# Patient Record
Sex: FEMALE | Race: WHITE | Employment: STUDENT | URBAN - METROPOLITAN AREA
[De-identification: names, ages, dates, MRNs, and addresses within clinical notes are randomized per-mention and may not be internally consistent; named-entity substitution may affect disease eponyms.]

---
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
## 2020-01-02 ENCOUNTER — HOSPITAL ENCOUNTER (OUTPATIENT)
Dept: PHYSICAL THERAPY | Age: 20
Setting detail: THERAPIES SERIES
Discharge: HOME | End: 2020-01-02
Payer: COMMERCIAL

## 2020-01-02 PROCEDURE — 97110 THERAPEUTIC EXERCISES: CPT

## 2020-01-02 PROCEDURE — 97530 THERAPEUTIC ACTIVITIES: CPT

## 2020-01-02 PROCEDURE — 97112 NEUROMUSCULAR REEDUCATION: CPT

## 2020-01-02 NOTE — FLOWSHEET NOTE
with band at knees   Chair Sits  dribblex5 to 60deg x30 dribblex5 to 60deg x30, will go to blue next rx   Forward step up 6in+airex dribblex5 x10 reps bilateral 6in+airex q3hoikuft x20 reps bilateral   Lateral Step Down Next rx 4in step, required band at lateral Rkneee to avoid valgus 2x15   Squat Hold on Bosu 8o46pdz with bball tossess at 60 deg +ball squeeze 1y10kza with band at knees to 60deg +bball throws   Calf Raises  DL off step x30 SL off step 2x10   4way hip Abd Add  40#x15, increase wgt next rx 55#x15   Lateral walking Next rx x20 each way    Proprioception x30 tossups SL black [id number] black, go to blue next rx   Excursions  bkwd on airex x15 bilateral with dribble SL black bosu bkwd x20   Shuttle Press 1C SL to 60deg x30 1Cx30   Extension Hang 2#5min 4#x5min   Quad Set over blue bolster x30 x30   SLR 2#x30 2#x30   Side Hip Abd 2#x30 2#x30   LAQ 2#x30 2#x30   Calf Stretch  2g80ffu 5o30tly   Ham Stretch seated 9j43hdg 3d54llr     Home Exercises Given: cont HEP the above exercises. Assessment:    Doing well, mild ITB pain, cont rolling    Treatment/Activity Tolerance:  [x] Patient tolerated treatment well [] Patient limited by fatigue  [] Patient limited by pain  [] Patient limited by other medical complications  [] Other:     Plan: Advance per protocol.   PT twice a week    Time In/Time Out: 4762-2121       Timed Code/Total Treatment Minutes:  2TE 1N 1TA    Electronically signed by:

## 2020-01-09 ENCOUNTER — HOSPITAL ENCOUNTER (OUTPATIENT)
Dept: PHYSICAL THERAPY | Age: 20
Setting detail: THERAPIES SERIES
Discharge: HOME | End: 2020-01-09
Payer: COMMERCIAL

## 2020-01-09 PROCEDURE — 97112 NEUROMUSCULAR REEDUCATION: CPT

## 2020-01-09 PROCEDURE — 97110 THERAPEUTIC EXERCISES: CPT

## 2020-01-09 NOTE — FLOWSHEET NOTE
[facility]) Physical Therapy at [address] [place], [address]  Phone: [phone]   Fax:[phone]         Physical Therapy Treatment Note  Date:  2020    Patient Name:  Alvin Vera      :  2000    MRN: [id number]    Diagnosis: L ACL Hamstring graft + Med Men Repair     Referring Practitioner: Dr Fatimah Harry  Next Doctor Visit:  Not scheduled      Insurance/Certification information:     Plan of care signed (Y/N): Pending  Changes to ambulatory summary sheet? No    Summary of history from Evaluation:  Sport:  Sophomore Basketball guard  Patient Goal: Pt wants to be released by Aug   Patient History/Mechanism of Injury:  during game, came down from jumping and felt a pop. Came out of game. Had MRI 2 days later. Surgery 19  PHM: R ACL Ham graft 2017     Subjective/Pain: States she went out sat night and increased standing    Eval 19   Medial ant knee sore 2-5/10 Medial hamstring pain 0/10   Post knee/med hamstring feels like a bruise + pulled hamstring tight and sore 3-6/10. Past 24hours = no pain meds or anti-inflam Posterior knee pain gone   Apple sized area of numbness lateral knee just inf to joint line 8/10 Apple sized area of numbness lateral knee just inf to joint line 2/10     Objective: mild increase swelling today, states she has been walking more  Extension: 3-4 deg flexed at onset of PT.  0 deg after hang/ES.   R knee=1-2flexed, never regained full flexion after last ACL sx 3 years ago  Flexion: 416053 degrees open endfeel     Exercises:  L ACL Ham graft + Med Men repair 19    Visit#10 Visit#11 Visit#12    -7wks 1/7=8wks 1/7=8wks   ROM          Extension  0deg after hang Tight today, 0deg after hang   Flexion (<90 for 4wks) 125 no pain open endfeel  130, mild tightness today 130   Exercises      Elliptical  2miles 14 min 2miles 14min 5min, add dynamics next rx   Prone Ball Hamstrings  Light hold 60sec Light hold 60sec Light hold 60sec   Bridges  Next rx DL hands up x30 with band at knees Band at knees on bosux30   Chair Sits  dribblex5 to 60deg x30 dribblex5 to 60deg x30, will go to blue next rx SL +6in x15   Forward step up 6in+airex dribblex5 x10 reps bilateral 6in+airex j7rpvqcso x20 reps bilateral 8in +airex p2pxcezmb x20   Lateral Step Down Next rx 4in step, required band at lateral Rkneee to avoid valgus 2x15   6in x30   Squat Hold on Bosu 7c77iop with bball tossess at 60 deg +ball squeeze 1q80rcp with band at knees to 60deg +bball throws 7p15xhk   Calf Raises  DL off step x30 SL off step 2x10 SL 2x10   4way hip Abd Add  40#x15, increase wgt next rx 55#x15 55#x15   Lateral walking Next rx x20 each way  x30 ea way   Proprioception x30 tossups SL black [id number] black, go to blue next rx Blue toussups   R=30 L=30   Excursions  bkwd on airex x15 bilateral with dribble SL black bosu bkwd x20 Post medial + post lateral x10   SL press   10# DL up and SL down 2x10   Quad Extension   Next rx   Extension Hang 2#5min 4#x5min 5#x5   Quad Set over blue bolster x30 x30 x30   SLR 2#x30 2#x30 3#x30   LAQ 2#x30 2#x30 3#x30   Calf Stretch  0j38koj 0p33lzn 0y05htt   Ham Stretch seated 3e19ubw 4h84zfj 7c57agi     Home Exercises Given: cont HEP the above exercises. Assessment:    Mild swelling, no pain, went to gym for shooting after PT today    Treatment/Activity Tolerance:  [x] Patient tolerated treatment well [] Patient limited by fatigue  [] Patient limited by pain  [] Patient limited by other medical complications  [] Other:     Plan: Advance per protocol.   PT twice a week    Time In/Time Out: 2489-9787       Timed Code/Total Treatment Minutes:  2TE 1N     Electronically signed by:

## 2020-01-14 ENCOUNTER — HOSPITAL ENCOUNTER (OUTPATIENT)
Dept: PHYSICAL THERAPY | Age: 20
Setting detail: THERAPIES SERIES
Discharge: HOME | End: 2020-01-14
Payer: COMMERCIAL

## 2020-01-14 PROCEDURE — 97112 NEUROMUSCULAR REEDUCATION: CPT

## 2020-01-14 PROCEDURE — 97110 THERAPEUTIC EXERCISES: CPT

## 2020-01-14 NOTE — FLOWSHEET NOTE
[facility]) Physical Therapy at [address] [place], [address]  Phone: [phone]   Fax:[phone]         Physical Therapy Treatment Note  Date:  2020    Patient Name:  Bel Kirkland      :  2000    MRN: [id number]    Diagnosis: L ACL Hamstring graft + Med Men Repair     Referring Practitioner: Dr Yolis Billy  Next Doctor Visit:  Not scheduled      Insurance/Certification information:     Plan of care signed (Y/N): Pending  Changes to ambulatory summary sheet? No    Summary of history from Evaluation:  Sport:  Sophomore Basketball guard  Patient Goal: Pt wants to be released by Aug   Patient History/Mechanism of Injury:  during game, came down from jumping and felt a pop. Came out of game. Had MRI 2 days later. Surgery 19  PHM: R ACL Ham graft 2017     Subjective/Pain: States she went out sat night and increased standing    Eval 19   Medial ant knee sore 2-5/10 Medial hamstring pain 0/10   Post knee/med hamstring feels like a bruise + pulled hamstring tight and sore 3-6/10. Past 24hours = no pain meds or anti-inflam Posterior knee pain gone   Apple sized area of numbness lateral knee just inf to joint line 8/10 Apple sized area of numbness lateral knee just inf to joint line 2/10     Objective: mild increase swelling today, states she has been walking more  Extension: 3-4 deg flexed at onset of PT.  0 deg after hang/ES.   R knee=1-2flexed, never regained full flexion after last ACL sx 3 years ago  Flexion: 269104 degrees open endfeel     Exercises:  L ACL Ham graft + Med Men repair 19    Visit#10 Visit#11 Visit#12 Visit#13    -7wks =8wks =8wks -9wks    ROM           Extension  0deg after hang Tight today, 0deg after hang o after hang   Flexion (<90 for 4wks) 125 no pain open endfeel  130, mild tightness today 130 130   Exercises       Elliptical  2miles 14 min

## 2020-01-17 ENCOUNTER — HOSPITAL ENCOUNTER (OUTPATIENT)
Dept: PHYSICAL THERAPY | Age: 20
Setting detail: THERAPIES SERIES
Discharge: HOME | End: 2020-01-17
Payer: COMMERCIAL

## 2020-01-17 PROCEDURE — 97110 THERAPEUTIC EXERCISES: CPT

## 2020-01-17 PROCEDURE — 97112 NEUROMUSCULAR REEDUCATION: CPT

## 2020-01-17 NOTE — FLOWSHEET NOTE
Bridges on bench  Band at knees on bosux30 DL on bench holding basketball  DL + throw x30   Chair Sits  SL +6in x15 SL+6in 30sec L=28 R=26 with good tech DL +tb on airex +3in + abd kick x20 andrea   Forward step up 8in +airex z1zkytywv x20 10in + airex x15 NT   Lateral Step Down   6in x30 6in R=27 with good tech L=21 + hip flex x20 on 6in bilateral   Squat Hold on Bosu 5k79yzt 4f57xsm with VB toss 5e43kshw + VB   Calf Raises  SL 2x10 SL 2x15 SL 2x15   4way hip Abd Add  55#x15 55#x15 55#x15   Drop hold split lunge  Back R foot on 6in step x20 drop   Lateral walking x30 ea way x20 ea way with MB sh extension x20 +MB punch out   Proprioception Blue toussups   R=30 L=30 NT NT   RDL  DL 9#bar to 12in step x15 DL 9#bar to 12in x15   Excursions  Post medial + post lateral x10 bkwd on blue bosu x20 bkwd = knee tuck x15 bilateral   SL press 10# DL up and SL down 2x10 10# to 70deg x10 NT   Quad Extension Next rx Next rx Next rx   Extension Hang 5#x5 5#x5min 5#x5min   Quad Set over blue bolster x30 NT NT   SLR 3#x30 NT 5#x30  SLabd5#x30   Calf Stretch  9y42mgb  7j04kof   Ham Stretch seated 9i07vsg  5y43vfn     Home Exercises Given: cont HEP the above exercises. Assessment:    No pain. Pt doing well. Treatment/Activity Tolerance:  [x] Patient tolerated treatment well [] Patient limited by fatigue  [] Patient limited by pain  [] Patient limited by other medical complications  [] Other:     Plan: Advance per protocol.   PT twice a week    Time In/Time Out: 5246-4249     Timed Code/Total Treatment Minutes:  2TE 1N     Electronically signed by:

## 2020-01-21 ENCOUNTER — HOSPITAL ENCOUNTER (OUTPATIENT)
Dept: PHYSICAL THERAPY | Age: 20
Setting detail: THERAPIES SERIES
Discharge: HOME | End: 2020-01-21
Payer: COMMERCIAL

## 2020-01-21 PROCEDURE — 97110 THERAPEUTIC EXERCISES: CPT

## 2020-01-21 PROCEDURE — 97112 NEUROMUSCULAR REEDUCATION: CPT

## 2020-01-21 NOTE — FLOWSHEET NOTE
[facility]) Physical Therapy at [address], [address]  Phone: [phone]   Fax:[phone]         Physical Therapy Treatment Note  Date:  2020    Patient Name:  Tyrone Duarte      :  2000    MRN: [id number]    Diagnosis: L ACL Hamstring graft + Med Men Repair     Referring Practitioner: Dr Girish Nichole  Next Doctor Visit:  Will see fellow up here at [facility] on wk 12     Insurance/Certification information:     Plan of care signed (Y/N): Pending  Changes to ambulatory summary sheet? No    Summary of history from Evaluation:  Sport:  Sophomore Basketball guard  Patient Goal: Pt wants to be released by Aug   Patient History/Mechanism of Injury:  during game, came down from jumping and felt a pop. Came out of game. Had MRI 2 days later. Surgery 19  PHM: R ACL Ham graft 2017     Subjective/Pain: States she went out sat night and increased standing    Eval 19   Medial ant knee sore 2-5/10 Medial hamstring pain 0/10   Post knee/med hamstring feels like a bruise + pulled hamstring tight and sore 3-6/10. Past 24hours = no pain meds or anti-inflam Posterior knee pain gone   Apple sized area of numbness lateral knee just inf to joint line 8/10 Apple sized area of numbness lateral knee just inf to joint line 2/10     Objective: no pain, no buckling, no catching. Extension: 3-4 deg flexed at onset of PT.  0 deg after hang/ES. R knee=1-2flexed, never regained full flexion after last ACL sx 3 years ago  Flexion: 135 degrees open endfeel     Exercises:  L ACL Ham graft + Med Men repair 19    Visit#14 Visit#15    =10wks =10wks   Warm Up      Elliptical .5mile 6:15 .5mile 6:20   Dynamics 100ft 100ft   Exercises     Agility no hop . 5mile 6:15 +dynamics x60sec   Air hops on  0Cx30   Split squat   Lfwd only x30   Prone Ball Hamstrings  Standing x60sec   Bridges on bench  DL + throw x30 DL + throw x30   Chair Sits  DL +tb on airex +3in + abd kick x20 andrea DL+3in on airex +abd kick x20 andrea   Forward step up NT 10in + airex +dribblex3 x20   Lateral Step Down + hip flex x20 on 6in bilateral +hip flex 6in x30 go to 8in next rx   Squat Hold on Bosu 3z56trqb + VB 8k40bgx +VB   Calf Raises  SL 2x15 SL 1x30   4way hip Abd Add  55#x15 70#x15   Lateral walking x20 +MB punch out x20 +MB to floor x20 each way   Proprioception NT Blue tossups x30 bilateral   RDL DL 9#bar to 12in x15 SL 10# plate M16 ea   Excursions  bkwd = knee tuck x15 bilateral x20   SL press NT L=15# 2x10   Stool pull  SL 20 pulls bilateral, try 40 pulls next rx (ham graft)   Quad Extension Next rx 10# 2x10 90-40 will do once a week   Extension Hang 5#x5min NT   Quad Set over blue bolster NT NT   SLR 5#x30  SLabd5#x30 NT   Calf Stretch  7o94zii 4v86ydl   Ham Stretch seated 3d73vyw 2y07gqd     Home Exercises Given: cont HEP the above exercises. Assessment:    No pain. Pt doing well. Pt is aggressive and needs consistent reminders to be patient    Treatment/Activity Tolerance:  [x] Patient tolerated treatment well [] Patient limited by fatigue  [] Patient limited by pain  [] Patient limited by other medical complications  [] Other:     Plan: Advance per protocol.   PT twice a week    Time In/Time Out: 5500-9545     Timed Code/Total Treatment Minutes:  2TE 1N     Electronically signed by:

## 2020-01-24 ENCOUNTER — HOSPITAL ENCOUNTER (OUTPATIENT)
Dept: PHYSICAL THERAPY | Age: 20
Setting detail: THERAPIES SERIES
Discharge: HOME | End: 2020-01-24
Payer: COMMERCIAL

## 2020-01-24 PROCEDURE — 97110 THERAPEUTIC EXERCISES: CPT

## 2020-01-24 PROCEDURE — 97112 NEUROMUSCULAR REEDUCATION: CPT

## 2020-01-24 NOTE — FLOWSHEET NOTE
x60sec x60 sec   Air hops on  0Cx30 0C x 30   Split squat   Lfwd only x30 Lfwd only x 30   Prone Ball Hamstrings  Standing x60sec Standing x 60 sec andrea   Bridges on bench  DL + throw x30 DL + throw x30 DL + throw x 30   Chair Sits  DL +tb on airex +3in + abd kick x20 andrea DL+3in on airex +abd kick x20 andrea DL + 3 in on airex + abd kick x 20 andrea   Forward step up NT 10in + airex +dribblex3 x20 10 in + airex + dribble x 20   Lateral Step Down + hip flex x20 on 6in bilateral +hip flex 6in x30 go to 8in next rx 8in x 30   Squat Hold on Bosu 1a79bifl + VB 4f97cak +VB 2 x 60 seconds + VB   Calf Raises  SL 2x15 SL 1x30 SL x 30   4way hip Abd Add  55#x15 70#x15 70# x 15   Lateral walking x20 +MB punch out x20 +MB to floor x20 each way X 20 + MB x 20 each way   Proprioception NT Blue tossups x30 bilateral Blue tossups x 30 andrea   RDL DL 9#bar to 12in x15 SL 10# plate Q50 ea SL 22# plate x 15   Excursions  bkwd = knee tuck x15 bilateral x20 X 20 on blue airex   SL press NT L=15# 2x10 L=15# 2 x 10   Stool pull  SL 20 pulls bilateral, try 40 pulls next rx (ham graft) X 40 pulls   Quad Extension Next rx 10# 2x10 90-40 will do once a week NT   Extension Hang 5#x5min NT NT   Quad Set over blue bolster NT NT NT   SLR 5#x30  SLabd5#x30 NT NT   Calf Stretch  3h35yqa 6d48kqs 2 x 60 sec   Ham Stretch seated 0v52dhc 4k65jqp 2 x 60 sec     Home Exercises Given: cont HEP the above exercises. Assessment:    No pain, pt tolerating all exercises well. Pt is aggressive and wanting to advance requiring reminders to be patient   Treatment/Activity Tolerance:  [x] Patient tolerated treatment well [] Patient limited by fatigue  [] Patient limited by pain  [] Patient limited by other medical complications  [] Other:     Plan: Advance per protocol.   PT twice a week    Time In/Time Out: 1681-9106     Timed Code/Total Treatment Minutes:  2 TE, 1 N    Electronically signed by:  Jeana Del Rio, PTA [id number]

## 2020-01-28 ENCOUNTER — HOSPITAL ENCOUNTER (OUTPATIENT)
Dept: PHYSICAL THERAPY | Age: 20
Setting detail: THERAPIES SERIES
Discharge: HOME | End: 2020-01-28
Payer: COMMERCIAL

## 2020-01-28 PROCEDURE — 97110 THERAPEUTIC EXERCISES: CPT

## 2020-01-28 PROCEDURE — 97112 NEUROMUSCULAR REEDUCATION: CPT

## 2020-01-28 NOTE — FLOWSHEET NOTE
[facility]) Physical Therapy at [address] [place], [address]  Phone: [phone]   Fax:[phone]         Physical Therapy Treatment Note  Date:  2020    Patient Name:  Rachana Finch      :  2000    MRN: [id number]    Diagnosis: L ACL Hamstring graft + Med Men Repair     Referring Practitioner: Dr Aaron Viera  Next Doctor Visit:  Will see fellow up here at [facility] on wk 12     Insurance/Certification information:     Plan of care signed (Y/N): Pending  Changes to ambulatory summary sheet? No    Summary of history from Evaluation:  Sport:  Sophomore Basketball guard  Patient Goal: Pt wants to be released by Aug   Patient History/Mechanism of Injury:  during game, came down from jumping and felt a pop. Came out of game. Had MRI 2 days later. Surgery 19  PHM: R ACL Ham graft 2017     Subjective/Pain: Pt reports knee is doing great and has no pain. Eval 19   Medial ant knee sore 2-5/10 Medial hamstring pain 0/10   Post knee/med hamstring feels like a bruise + pulled hamstring tight and sore 3-6/10. Past 24hours = no pain meds or anti-inflam Posterior knee pain gone   Apple sized area of numbness lateral knee just inf to joint line 8/10 Apple sized area of numbness lateral knee just inf to joint line 2/10     Objective: no pain, no buckling, no catching. Extension: 3-4 deg flexed at onset of PT.  0 deg after hang/ES. R knee=1-2flexed, never regained full flexion after last ACL sx 3 years ago  Flexion: 135 degrees open endfeel     Exercises:  L ACL Ham graft + Med Men repair 19    Visit #16 Visit#17     =11wks   Warm Up      Elliptical .5mi . 5mile   Dynamics 100 ft 100ft   Exercises     Agility no hop x60 sec 0g09cjc   Air hops on 0C x 30 0Cx30   Split squat  Lfwd only x 30 x30 bilateral   Ball Hamstrings Standing x 60 sec andrea x60sec    Bridges on bench  DL + throw x 30 DL x30 + Throw

## 2020-01-31 ENCOUNTER — HOSPITAL ENCOUNTER (OUTPATIENT)
Dept: PHYSICAL THERAPY | Age: 20
Setting detail: THERAPIES SERIES
Discharge: HOME | End: 2020-01-31
Payer: COMMERCIAL

## 2020-01-31 PROCEDURE — 97530 THERAPEUTIC ACTIVITIES: CPT

## 2020-01-31 PROCEDURE — 97110 THERAPEUTIC EXERCISES: CPT

## 2020-01-31 PROCEDURE — 97112 NEUROMUSCULAR REEDUCATION: CPT

## 2020-01-31 NOTE — FLOWSHEET NOTE
[facility]) Physical Therapy at [address], [address]  Phone: [phone]   Fax:[phone]         Physical Therapy Treatment Note  Date:  2020    Patient Name:  Marj Paniagua      :  2000    MRN: [id number]    Diagnosis: L ACL Hamstring graft + Med Men Repair     Referring Practitioner: Dr Marilyn Hall  Next Doctor Visit:  Will see fellow up here at [facility] on wk 12     Insurance/Certification information:     Plan of care signed (Y/N): Pending  Changes to ambulatory summary sheet? No    Summary of history from Evaluation:  Sport:  Sophomore Basketball guard  Patient Goal: Pt wants to be released by Aug   Patient History/Mechanism of Injury:  during game, came down from jumping and felt a pop. Came out of game. Had MRI 2 days later. Surgery 19  PHM: R ACL Ham graft 2017     Subjective/Pain: Pt reporting no knee pain, but has some soreness in hamstrings from last session on Tuesday. Eval 19   Medial ant knee sore 2-5/10 Medial hamstring pain 0/10   Post knee/med hamstring feels like a bruise + pulled hamstring tight and sore 3-6/10. Past 24hours = no pain meds or anti-inflam Posterior knee pain gone   Apple sized area of numbness lateral knee just inf to joint line 8/10 Apple sized area of numbness lateral knee just inf to joint line 2/10     Objective: no pain, no buckling, no catching. Extension: 2-3 deg flexed at onset of PT.  0 deg after hang/ES. R knee=1-2 flexed, never regained full flexion after last ACL sx 3 years ago  Flexion: 135 degrees open endfeel     Exercises:  L ACL Ham graft + Med Men repair 19    Visit #16 Visit#17 Visit #18     =11wks    Warm Up       Elliptical .5mi . 5mile . 5mile 6:15   Dynamics 100 ft 100ft 100 ft   Exercises      Agility no hop x60 sec [id number] 2 x 60 sec   Air hops on 0C x 30 0Cx30 0 C x 30   Split squat  Lfwd only x 30 x30 bilateral X 30 andrea   Ball Hamstrings Standing x 60 sec andrea x60sec  X 60 sec   Bridges on bench  DL + throw x 30 DL x30 + Throw DL x 30 + toss   Chair Sits  DL + 3 in on airex + abd kick x 20 andrea DL+3in +TB +airex x20 andrea DL +3 in +TB +airex x 20 andrea   Forward step up 10 in + airex + dribble x 20 6in+bosu  + 5 dribbles x20 6\" +BOSU 5 dribbles x 20 andrea   Lateral Step Down 8in x 30 8in x30 in 30sec 8in x 30   Squat Hold on Bosu 2 x 60 seconds + VB [id number] bball toss 2 x 60 sec with bball toss   Calf Raises  SL x 30 SLx30 SL x 30   4way hip Abd Add  70# x 15 70#x15 70# x 15   Lateral walking X 20 + MB x 20 each way x20 +MB to ground x25 steps MB to ground x 25 step each way   Proprioception Blue tossups x 30 andrea NT NT   RDL SL 10# plate x 15 NT NT   Excursions  X 20 on blue airex x20 on airex X 20 on Black BOSU   SL press L=15# 2 x 10 15# 2x10 15# 2 x 10   Stool pull X 40 pulls 1 lap 1 lap   Quad Extension NT 20# 3sec hold at 40 deg 2x10 20# +3 sec hold at 40deg 2 x10   Extension Hang NT 5#x5min 5# x 5 min   Quad Set over blue bolster NT NT NT   SLR NT NT NT   Calf Stretch  2 x 60 sec [id number] 2 x 60 sec   Ham Stretch seated 2 x 60 sec [id number] 2 x 60 sec     Home Exercises Given: cont HEP the above exercises. Assessment:  Pt very motivated and tolerating increases in exercises well. Pt sore after last session. Treatment/Activity Tolerance:  [x] Patient tolerated treatment well [] Patient limited by fatigue  [] Patient limited by pain  [] Patient limited by other medical complications  [] Other:     Plan: Advance per protocol.   PT twice a week    Time In/Time Out: 4552-1287    Timed Code/Total Treatment Minutes:   1 N, 1 TA, 1 TE    Electronically signed by:  Yamile Mistry, PTA [id number]

## 2020-02-04 ENCOUNTER — HOSPITAL ENCOUNTER (OUTPATIENT)
Dept: PHYSICAL THERAPY | Age: 20
Setting detail: THERAPIES SERIES
Discharge: HOME | End: 2020-02-04
Payer: COMMERCIAL

## 2020-02-04 PROCEDURE — 97530 THERAPEUTIC ACTIVITIES: CPT

## 2020-02-04 NOTE — PROGRESS NOTES
[facility]) at [facility]   [address] [place] [address]  Fax [phone]     Tianna Kuo PT,ATC Phone: [phone]       Referring Practitioner: Dr Emeka Plascencia  From: Ksenia Ivy, PT ATC      Patient: Valente Contreras                    : 2000     Diagnosis: L ACL Hamstring graft + Med Men Repair 19    Date: 2020   PT RE-ASSESSMENT                 Evaluation Date: 19         Total Visits to date: 18      Outcome Measure: IKDC                     Initial Score:  26%  Discharge Score:             Sport:  Sophomore Basketball guard  Patient Goal: Pt wants to be released by Aug   Patient History/Mechanism of Injury:  during game, came down from jumping and felt a pop. Came out of game. Had MRI 2 days later. Surgery 19  PHM: R ACL Ham graft 2017    Symptoms:     Eval 19   TODAY 20   Median ant knee sore 2-5/10 No pain   Post knee/med hamstring feels like a bruise + pulled hamstring tight and sore 3-6/10.   Past 24hours = no pain meds or anti-inflam   No pain   Apple sized area of numbness lateral knee just inf to joint line 8/10 Still numb 2/10   Aggrevators: sitting with leg hanging     Relievers: change positions,ice    Objective/Significant Findings + Goals    L ACL + Med Men Repair Ham graft 11-12-19 11-18-19 12-15-19  12/17=5wks postop 20=12wks postop  Goals    Outcome IKDC IKDC 26% IKDC 54% IKDC 71.3%  >50% in 6wks   Brace Locked brace  Brace locked for gait, 2 crutches WBAT Has not yet been fitted for functional brace   Unlock at 4wks   Pain 2-8/10  0/10  MET   Sleeping Not sleeping well at all, only slept 4 hrs last night Sleeps all night  Sleeps all night   MET   Gait 2 crutches PWB  2 crutches WBAT Normal walking gait  Normal JOGGING gait   Quad Tone Fair-  Fair+/ Good- GOOD  Excellent   SLR  No lag 2#x30 NO LAG  MET   PROM Flexion 60deg 115 open L=130  R=136  MET   PROM Ext L=2flexed  R=0deg **pt states she didn't get full ext after R surgery   L=0   R=0    bilateral 0 deg extension    MET   Edema (knee circumference) L=34cm  R=32cm L=33cm  R=32cm L=32cm  R=32cm  MET   Quad Girth (4in sup to patella) L=43cm  R=45.5cm L=42cm  R=45.5 L=44cm  R=45cm  MET   Single Leg Balance on airex  0 LOB bilateral in 60sec Blue bosu  L= 30  R=30 0%   MET   Lateral Step Downs NT NT 8in 30sec L= 22  R=27 27% Assess at wk 12   Single Leg Chair sit  NT NT +6IN L=30  R=30 0% +3in x30   SL Bosu Tossup NT NT Blue L=30 R=30 (0%) 0% MET   SL Seted        SL Seated Press NT NT L=50#x10    R=70#x10 29%    SL Prone Hamstring Curl NT NT L=NT  R=20#x10*cramps (100%) 100% 30#x10   Y Test    Fwd L=29in  R=30in  PL L=40in   R=41in  PM L=41in  R=42in 3% MET   Hip Abd Microfet  NT NT L=27  R=27 10% 30 bilateral   SL horizontal hop   Not tested due to inadequate time from surgery 100% Equal to height   SL triple hop   %    SL 20yd hop for time   %    FMS   15/21 17% 18      40% Deficit L vs Goal      Goal Status: [] Achieved [x] Partially Achieved      Assessment:  Rehab Potential:   [x] Excellent PATIENT IS HIGHLY MOTIVATED AND VERY EAGER TO BEGIN RUNNING. PATIENT HAD A PREVIOUS ACL SURGERY WHERE SHE RETURNED TO BASKETBALL AT 6 MONTHS POST OP AND PLANS TO GO SEE DR Yajaira Medrano [facility] TO DISCUSS ACCELERATED REHAB. Education on:  Reviewed surgical procedure, anatomy, precautions. Plan: Will continue 2X per week for 4-6 weeks then re-assess. See attached protocol.  Will begin thoughtful plyo and initiate the walk jog progression as guided by MD  [x] Therapeutic Exercise  [x] Modalities:  [] Ultrasound [] Electrical Stimulation [] Cervical Traction   [x] Therapeutic Activity        [x] Gait Training        [x] Neuromuscular Re-education   [x] Instruction in HEP        [x] Manual Therapy        [] Aquatic Therapy                                 Physical Therapy Certification/Re-Certification Form    Dr Indra Bruce   The following patient has been evaluated for physical therapy services and for therapy to continue, insurance requires physician review of the treatment plan initially and every 90 days. Please review the attached evaluation and/or summary of the patient's plan of care, and verify that you agree therapy should continue by signing the attached document and sending it back to our office. Patient agrees with established plan of care and assisted in the development of their short term and long term goals. Patient had no adverse reaction with initial treatment and there are no barriers to learning. Demonstrates no mental or cognitive disorder. 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 If we are requesting more visits, we fully anticipate the patient's condition is expected to improve within the treatment timeframe we are requesting. Patient Status:      __X___Re-Assessment, recommend additional visits     _____Hold therapy for MD visit     _____Discharge      Electronically signed by:  Jaron Sumner PT, 2/4/2020, 1:04 PM    If you have any questions or concerns, please don't hesitate to call.  Thank you for your referral.    Physician Signature:__________________________________ Date:______ Time: ________  By signing above, therapists plan is approved by physician

## 2020-02-11 ENCOUNTER — HOSPITAL ENCOUNTER (OUTPATIENT)
Dept: PHYSICAL THERAPY | Age: 20
Setting detail: THERAPIES SERIES
Discharge: HOME | End: 2020-02-11
Payer: COMMERCIAL

## 2020-02-11 PROCEDURE — 97112 NEUROMUSCULAR REEDUCATION: CPT

## 2020-02-11 PROCEDURE — 97110 THERAPEUTIC EXERCISES: CPT

## 2020-02-11 PROCEDURE — 97530 THERAPEUTIC ACTIVITIES: CPT

## 2020-02-14 ENCOUNTER — HOSPITAL ENCOUNTER (OUTPATIENT)
Dept: PHYSICAL THERAPY | Age: 20
Setting detail: THERAPIES SERIES
Discharge: HOME | End: 2020-02-14
Payer: COMMERCIAL

## 2020-02-14 PROCEDURE — 97112 NEUROMUSCULAR REEDUCATION: CPT

## 2020-02-14 PROCEDURE — 97110 THERAPEUTIC EXERCISES: CPT

## 2020-02-14 PROCEDURE — 97530 THERAPEUTIC ACTIVITIES: CPT

## 2020-02-14 NOTE — FLOWSHEET NOTE
sec   Horizontal hops  DLx10  SLx10 SL x 10   Hops to blue Bosu  DL-DL x15 + bball catch DL-DL x 15 with catch   Happy Feet   On 6in step x60sec next rx 6 in step x 60 sec. Burpies  Next rx 30 in box x 30   Shuttle hops  0 C x 30 1C x10 1C x 15 andrea   Split squat  X 30 andrea NT NT   Ball Hamstrings X 60 sec NT NT   Bridges on bench  DL x 30 + toss SL 2x15 SL 2 x 15   Chair Sits  DL +3 in +TB +airex x 20 andrea DL +3 in +TB +airex x 20 andrea DL + 3in +TB on airex x 15 andrea   Lateral Step Down 8in x 30 Hop over step next rx X 30 hop over step   Squat Hold on Bosu 2 x 60 sec with bball toss SL wall sit with Red ball at 70 deg x60sec andrea + dribble SL with red ball at 70 x 60 sec andrea with dribble   Calf Raises  SL x 30 SLx30 SL x 30   4way hip Abd Add  70# x 15 70#x15 70# x 15   Lateral walking MB to ground x 25 step each way MB to ground + gallop x25 andrea MB to ground with gallop x 25 andrea   Proprioception NT bkwd lunge off bosu x15 bilateral No time   RDL NT NT NT   Excursions  X 20 on Black BOSU x20 with tactile cues to emphasize eccentric hamstrings X 20 verbal cues to emphasize eccentrics   SL press 15# 2 x 10 15# 2x10 15# 2 x 10   Stool pull 1 lap 1 lap 1 lap   Quad Extension 20# +3 sec hold at 40deg 2 x10 Will do this once a week Performed this week already   Hamstring roller NT Next rx With TB light resistance 2 x 10   Calf Stretch  2 x 60 sec [id number] 2 x 60 sec   Ham Stretch seated 2 x 60 sec [id number] 2 x 60 sec     Home Exercises Given: cont HEP the above exercises. Assessment: Pt with difficulty with hamstring roller exercise d/t weakness in hamstrings. Pt tolerating walk/jog sequence and other agility well with no pain. Treatment/Activity Tolerance:  [x] Patient tolerated treatment well [] Patient limited by fatigue  [] Patient limited by pain  [] Patient limited by other medical complications  [] Other:     Plan: Advance per protocol.   PT twice a week    Time In/Time Out: 2168-3713    Timed Code/Total Treatment Minutes:   1 TE, 2 TA, 1 N    Electronically signed by:  Radha Collins, PTA [id number]

## 2020-02-18 ENCOUNTER — HOSPITAL ENCOUNTER (OUTPATIENT)
Dept: PHYSICAL THERAPY | Age: 20
Setting detail: THERAPIES SERIES
Discharge: HOME | End: 2020-02-18
Payer: COMMERCIAL

## 2020-02-21 ENCOUNTER — HOSPITAL ENCOUNTER (OUTPATIENT)
Dept: PHYSICAL THERAPY | Age: 20
Setting detail: THERAPIES SERIES
Discharge: HOME | End: 2020-02-21
Payer: COMMERCIAL

## 2020-02-21 PROCEDURE — 97112 NEUROMUSCULAR REEDUCATION: CPT

## 2020-02-21 PROCEDURE — 97110 THERAPEUTIC EXERCISES: CPT

## 2020-02-21 PROCEDURE — 97530 THERAPEUTIC ACTIVITIES: CPT

## 2020-02-21 NOTE — FLOWSHEET NOTE
squat  X 30 andrea NT NT With hop x 15 andrea +6in   Ball Hamstrings X 60 sec NT NT NT   Bridges on bench  DL x 30 + toss SL 2x15 SL 2 x 15 SL 2 x 15   Chair Sits  DL +3 in +TB +airex x 20 andrea DL +3 in +TB +airex x 20 andrea DL + 3in +TB on airex x 15 andrea DL + 3in with TB on airex x 15   Lateral Step Down 8in x 30 Hop over step next rx X 30 hop over step Hop over step x 60 seconds   Squat Hold on Bosu 2 x 60 sec with bball toss SL wall sit with Red ball at 70 deg x60sec andrea + dribble SL with red ball at 70 x 60 sec andrea with dribble SL red 70 deg x 60 sec andrea   Calf Raises  SL x 30 SLx30 SL x 30 SL x 30   4way hip Abd Add  70# x 15 70#x15 70# x 15 70# x 15   Lateral walking MB to ground x 25 step each way MB to ground + gallop x25 andrea MB to ground with gallop x 25 andrea MB to ground with gallop x 25 andrea   Proprioception NT bkwd lunge off bosu x15 bilateral No time bkwd lunge off BOSU with bball dribble x 15 andrea   RDL NT NT NT NT   Excursions  X 20 on Black BOSU x20 with tactile cues to emphasize eccentric hamstrings X 20 verbal cues to emphasize eccentrics X 20 with tactile cues for form   SL press 15# 2 x 10 15# 2x10 15# 2 x 10 15# 2 x 10   Stool pull 1 lap 1 lap 1 lap HEP   Quad Extension 20# +3 sec hold at 40deg 2 x10 Will do this once a week Performed this week already 20# with hold 2 x 10   Hamstring roller NT Next rx With TB light resistance 2 x 10 No resistance today 3 x 10   lunges    Lateral and forward x 15 andrea on roller   Calf Stretch  2 x 60 sec [id number] 2 x 60 sec 2 x 60 sec HEP   Ham Stretch seated 2 x 60 sec [id number] 2 x 60 sec 2 x 60 sec HEP     Home Exercises Given: cont HEP the above exercises. Assessment: Pt progressing well with agility, jumping and running. Pt requiring min cues for form/technique with exercises. No pain with increased agility and exercises.      Treatment/Activity Tolerance:  [x] Patient tolerated treatment well [] Patient limited by fatigue  [] Patient limited by pain  [] Patient limited by other medical complications  [] Other:     Plan: Advance per protocol.   PT twice a week    Time In/Time Out: 7046-0400    Timed Code/Total Treatment Minutes:  1 TE, 1TA, 1 N    Electronically signed by:  Evette Malik, PTA [id number] Patient is a 45y old  Female who presents with a chief complaint of CF exacerbation (04 Apr 2019 07:33)      SUBJECTIVE / OVERNIGHT EVENTS:  No acute events or problems overnight.       MEDICATIONS  (STANDING):  ALBUTerol    0.083% 2.5 milliGRAM(s) Nebulizer two times a day  cefTAZidime  IVPB      cefTAZidime  IVPB 2 Gram(s) IV Intermittent every 8 hours  cholecalciferol 4000 Unit(s) Oral daily  dornase diana Solution 2.5 milliGRAM(s) Inhalation two times a day  loratadine 10 milliGRAM(s) Oral daily  montelukast 10 milliGRAM(s) Oral daily  pantoprazole    Tablet 40 milliGRAM(s) Oral before breakfast  predniSONE   Tablet 20 milliGRAM(s) Oral daily  sodium chloride 7% Inhalation 4 milliLiter(s) Inhalation two times a day    MEDICATIONS  (PRN):  acetaminophen   Tablet .. 650 milliGRAM(s) Oral every 6 hours PRN Mild Pain (1 - 3), Moderate Pain (4 - 6), Severe Pain (7 - 10)  ALBUTerol    90 MICROgram(s) HFA Inhaler 1 Puff(s) Inhalation four times a day PRN Bronchospasm      Vital Signs Last 24 Hrs  T(C): 36.5 (04 Apr 2019 04:58), Max: 36.5 (03 Apr 2019 19:47)  T(F): 97.7 (04 Apr 2019 04:58), Max: 97.7 (03 Apr 2019 19:47)  HR: 84 (04 Apr 2019 10:29) (79 - 91)  BP: 111/67 (04 Apr 2019 04:58) (102/71 - 111/67)  BP(mean): --  RR: 17 (04 Apr 2019 04:58) (17 - 18)  SpO2: 97% (04 Apr 2019 04:58) (96% - 97%)    CAPILLARY BLOOD GLUCOSE        I&O's Summary      PHYSICAL EXAM:  GENERAL: Comfortable, no acute distress   	HEAD:  Normocephalic, atraumatic  	HEENT: Moist mucous membranes  	NECK: Supple, No JVD  	NERVOUS SYSTEM:  Alert & Oriented X3  	CHEST/LUNG: diffuse rhonchi b/l  	HEART: Regular rate and rhythm, no murmur   	ABDOMEN: Soft, Nontender, Nondistended, Bowel sounds present  	EXTREMITIES:   No clubbing, cyanosis, or edema  	MUSCULOSKELTAL- No muscle tenderness, no joint tenderness  SKIN- warm and dry, no rash  LABS:                        10.6   10.21 )-----------( 353      ( 04 Apr 2019 06:20 )             34.1     Auto Eosinophil # x     / Auto Eosinophil % x     / Auto Neutrophil # x     / Auto Neutrophil % x     / BANDS % x        04-04    141  |  102  |  16  ----------------------------<  86  4.1   |  24  |  0.54    Ca    8.9      04 Apr 2019 06:20  Mg     2.0     04-04  Phos  3.7     04-04  TPro  6.7  /  Alb  3.9  /  TBili  < 0.2<L>  /  DBili  x   /  AST  13  /  ALT  14  /  AlkPhos  51  04-04            Lactate, Blood: 1.7 mmol/L (03-29 @ 15:45)      RESPIRATORY  VENT:    ABG: ( 04 Apr 2019 13:00 ) pH: 7.45  /  pCO2: 39    /  pO2: 89    / HCO3: 27    / Base Excess: 3.0   /  SaO2: 96.8                VBG:     RADIOLOGY & ADDITIONAL TESTS:  (Imaging Personally Reviewed): NA    Consultant(s) Notes Reviewed:  NA    Care Discussed with Consultants/Other Providers: OC

## 2020-02-25 ENCOUNTER — HOSPITAL ENCOUNTER (OUTPATIENT)
Dept: PHYSICAL THERAPY | Age: 20
Setting detail: THERAPIES SERIES
Discharge: HOME | End: 2020-02-25
Payer: COMMERCIAL

## 2020-02-25 PROCEDURE — 97110 THERAPEUTIC EXERCISES: CPT

## 2020-02-25 PROCEDURE — 97112 NEUROMUSCULAR REEDUCATION: CPT

## 2020-02-25 PROCEDURE — 97530 THERAPEUTIC ACTIVITIES: CPT

## 2020-02-27 ENCOUNTER — HOSPITAL ENCOUNTER (OUTPATIENT)
Dept: PHYSICAL THERAPY | Age: 20
Setting detail: THERAPIES SERIES
Discharge: HOME | End: 2020-02-27
Payer: COMMERCIAL

## 2020-02-27 PROCEDURE — 97112 NEUROMUSCULAR REEDUCATION: CPT

## 2020-02-27 PROCEDURE — 97110 THERAPEUTIC EXERCISES: CPT

## 2020-02-27 PROCEDURE — 97530 THERAPEUTIC ACTIVITIES: CPT

## 2020-02-27 NOTE — FLOWSHEET NOTE
[facility]) Physical Therapy at [address], [address]  Phone: [phone]   Fax:[phone]         Physical Therapy Treatment Note  Date:  2020    Patient Name:  Josiah Bautista      :  2000    MRN: [id number]    Diagnosis: L ACL Hamstring graft + Med Men Repair     Referring Practitioner: Dr Chelle Sampson  Next Doctor Visit:  Will see fellow up here at [facility] on wk 12     Insurance/Certification information:     Plan of care signed (Y/N): Pending  Changes to ambulatory summary sheet? No    Summary of history from Evaluation:  Sport:  Sophomore Basketball guard  Patient Goal: Pt wants to be released by Aug   Patient History/Mechanism of Injury:  during game, came down from jumping and felt a pop. Came out of game. Had MRI 2 days later. Surgery 19  PHM: R ACL Ham graft 2017     Subjective/Pain: No pain. Objective: no pain, no buckling, no catching. R knee=1-2 flexed, never regained full flexion after last ACL sx 3 years ago  Flexion: 135 degrees open endfeel     Exercises:  L ACL Ham graft + Med Men repair 19    Visit #19 Visit #20 Visit#21 Visit#22   Warm Up    3/3=16wks 3/3=16wks   Elliptical .1 walk/ . 3 jog x 3 sets . 5 mi on TM . 5mile elliptical    HEP 3laps on track, walk one, two sets     HEP 3laps on track, walk one, two sets   Dynamics 100 ft 100 ft 100ft 100ft   Exercises       JR    JR 2x25   Sliding board    x30   Horizontal hops SL x 10 SL x 10 SLx10 cues to land on toes Went to gym today   Hops to blue Bosu DL-DL x 15 with catch DL-DL x 15 with catch, directional next rx? DL-DL +VC + catch x15 Court runs 8  Box drill x3   Lateral shuffle [id number]   Step vertical   X2, will do more next rx Shooting 10 min on bosu with direct supervision   Happy Feet  6 in step x 60 sec.   6in step x 60 sec 2x60,did well, DC today  NT   Burpies 30 in box x 30 30in box x 30 To floor x15 NT   Shuttle

## 2020-03-03 ENCOUNTER — HOSPITAL ENCOUNTER (OUTPATIENT)
Dept: PHYSICAL THERAPY | Age: 20
Setting detail: THERAPIES SERIES
Discharge: HOME | End: 2020-03-03
Payer: COMMERCIAL

## 2020-03-03 PROCEDURE — 97530 THERAPEUTIC ACTIVITIES: CPT

## 2020-03-03 PROCEDURE — 97110 THERAPEUTIC EXERCISES: CPT

## 2020-03-03 PROCEDURE — 97112 NEUROMUSCULAR REEDUCATION: CPT

## 2020-03-05 ENCOUNTER — HOSPITAL ENCOUNTER (OUTPATIENT)
Dept: PHYSICAL THERAPY | Age: 20
Setting detail: THERAPIES SERIES
Discharge: HOME | End: 2020-03-05
Payer: COMMERCIAL

## 2020-03-05 PROCEDURE — 97110 THERAPEUTIC EXERCISES: CPT

## 2020-03-05 PROCEDURE — 97530 THERAPEUTIC ACTIVITIES: CPT

## 2020-03-05 PROCEDURE — 97112 NEUROMUSCULAR REEDUCATION: CPT

## 2020-03-05 NOTE — FLOWSHEET NOTE
[facility] at [address] [place], [address]  Phone: [phone]   Fax:[phone]         Physical Therapy Treatment Note  Date:  3/5/2020    Patient Name:  Jennifer Molina      :  2000    MRN: [id number]    Diagnosis: L ACL Hamstring graft + Med Men Repair     Referring Practitioner: Dr Raymundo Quach  Next Doctor Visit:  Will see fellow up here at [facility] on wk 12     Insurance/Certification information:     Plan of care signed (Y/N): Pending  Changes to ambulatory summary sheet? No    Summary of history from Evaluation:  Sport:  Sophomore Basketball guard  Patient Goal: Pt wants to be released by Aug   Patient History/Mechanism of Injury:  during game, came down from jumping and felt a pop. Came out of game. Had MRI 2 days later. Surgery 19  PHM: R ACL Ham graft 2017     Subjective/Pain: No pain. [facility] [place] [facility]     Objective: no pain, no buckling, no catching. R knee=1-2 flexed, never regained full flexion after last ACL sx 3 years ago  Flexion: 135 degrees open endfeel     Exercises:  L ACL Ham graft + Med Men repair 19    Visit#21 Visit#22 Visit#23 Visit#24   Warm Up  3/3=16wks 3/3=16wks 33=16wks 3/10=17wks   Elliptical .5mile elliptical    HEP 3laps on track, walk one, two sets     HEP 3laps on track, walk one, two sets   cont     . 5mile   Dynamics 100ft 100ft  100ft   Exercises       JR  JR 2x25 JR 3x25 JR2x25   Sliding board  x30 x30 x60sec   Horizontal hops SLx10 cues to land on toes Went to gym today SLx10 SLx10  SL double [id number]   Hops to blue Bosu DL-DL +VC + catch x15 Court runs 8  Box drill x3   Lateral shuffle 3x68war DL-DL +VC +Catch x15 DL-DL+vc+[id number]  DL-SL x10   Step vertical X2, will do more next rx Shooting 10 min on bosu with direct supervision x10 bilateral x10   Burpies To floor x15 NT x15 NT   Shuttle hops  1C x30 +toss NT 1Cx30 NT   Split squat   +3in R leg back on 6in step, L fwd x30 +hop +6in x15 bilateral Gym: accelerations, box drill, lateral shuffle   Roller Hamstrings *careful due to ham graft, DL roller +TB  x10 then SL bridge on the floor x20 each *hamstring graft  DL 2x10+TB  SL on floor x10 *careful due to ham graft, DL roller +TB  x10 then SL bridge on the floor x20 each *careful due to ham graft, DL roller +TB  x10 then SL bridge on the floor x20 each   Chair Sits  SL +3in x15 andrea NT SL +3in x15 andrea SL+3in x30 andrea   Squat Hold on Bosu Red ball on wall sit 60sec each side at 70deg  60sec red ball on wall at 70deg NT   Calf Raises  SL x30 SLx30 SLx30 NT   4way hip Abd Add  70#x15 70#x15 70#x20 ea 70#x20   Lateral walking NT MB to ground x25 ea way NT MBx20   Proprioception NT  NT NT   Excursions  Tactile cues x20 bilateral doing RDL each rep RDL Tactile cues x20 bilateral doing RDL each rep RDL 25# plate [id number]   SL press 20# 2x10  20#2x10 20#2x10   Stool pull NT NT NT SL 1 lap   Quad Extension Will do next rx 20# 2x10 NT 20#2x10   lunges NT NT Lateral and reverse on roller x15 NT   Calf Stretch  [id number]  0g90fpi 2a80bys   Ham Stretch seated 0m96ico  3i60ewh 2f32jza     Home Exercises Given: cont HEP the above exercises. Assessment: pt needs cues to slow down. She can be quick and hasty with exercises. Treatment/Activity Tolerance:  [x] Patient tolerated treatment well [] Patient limited by fatigue  [] Patient limited by pain  [] Patient limited by other medical complications  [] Other:     Plan: Advance per protocol.   PT twice a week    Time In/Time Out: 2742-3190    Timed Code/Total Treatment Minutes:  1 TE, 1TA, 1 N    Electronically signed by:

## 2020-03-18 ENCOUNTER — HOSPITAL ENCOUNTER (OUTPATIENT)
Dept: PHYSICAL THERAPY | Age: 20
Setting detail: THERAPIES SERIES
Discharge: HOME | End: 2020-03-18
Payer: COMMERCIAL

## 2021-03-10 ENCOUNTER — HOSPITAL ENCOUNTER (OUTPATIENT)
Dept: PHYSICAL THERAPY | Age: 21
Setting detail: THERAPIES SERIES
Discharge: HOME | End: 2021-03-10
Payer: COMMERCIAL

## 2021-03-10 PROCEDURE — 97161 PT EVAL LOW COMPLEX 20 MIN: CPT

## 2021-03-10 PROCEDURE — G0283 ELEC STIM OTHER THAN WOUND: HCPCS

## 2021-03-10 NOTE — PROGRESS NOTES
[facility]) at [facility]   [address] [address]  Fax [phone]     Master Powell PT,ATC Phone: [phone]                                              To:  Dr Jacqueline Zarate          From: Kinsey March, PT ATC      Patient: Leisa Faith                    : 2000    Diagnosis: L ACL revision BPTB + Med Men Repair + ALL autograft 21    Date: 3/10/2021   Initial PT Evaluation/POC                   Evaluation Date: 03-10-21         Total Visits to date: 1       Outcome Measure:                      Initial Score:    Discharge Score:             Sport:  Basketball Vinny point guard  Patient Goal:  Pt wants to rehab as though she will play next season, though she isn't sure if she will play. Patient History/Mechanism of Injury: Pt tore R ACL vinny year in [facility] . Had ACL ham graft , was released at 6 months. Came to [place] 2018 with no issues. Tore L ACL in 2019, had hamstring graft, did well in rehab, released to basketball in 2020. On  pt was at basketball practice and had contact with another player and was hit on the medial side of L knee. Pt felt two pops and pain. Surgery 21. Symptoms:     03-10-21   Pain two days after surgery was 6/10, but has slowly decreased since then. Today 2/10 at knee both medial and lateral to patellar tendon. Pain worse at night.          Aggrevators:  WB             Relievers: rest      Objective/Significant Findings + Goals     03-10-21 Goals TBA prior to DC to [facility] op Week      Outcome IKDC  >90% indicating psychological readiness to compete   Brace locked Fittend for functional brace   Pain 3-7/10 No pain   Sleeping Waking 1-2 times per night Not waking due to LE sx   Gait 2 crutches TTWB locked brace Normal walking/jogging gait on flat and uneven surfaces at endurance and sprint speeds   Quad Tone Poor Excellent with noted VMO fully engaged   PROM Flexion 60 Full A/PROM   PROM Ext 0 Full A/PROM   Edema (knee circumference at jt line) L=37.5cm  R=34cm <1cm edema   Quad Girth (5in sup to patella) L=43.5cm  R=46cm <2cm difference   SLR Min assist x10 I x30   Lateral Step Down timed NT 30 reps in 30 sec 8in step   Single Leg Squat  NT To 70deg x30 reps in 30sec   Proprioception SL Bosu Tossup NT X30 to ceiling, eyes on ball    SL Horizontal Hop NT Jump=height of athlete   SL Triple Crossover  NT = to nonsurgical extremity   SL 20yd hop for time NT <6sec bilateral   SL Seated Press NT 50% BW x10   SL Seated Quad Extension NT 50%BW x10   SL Prone Hamstring Curl NT 60-80% of Quads x10   Quad / Hamstring ratio NT >60%female  >70%male        Quality of Movement Patterns         Tuck Jump 10sec   No valgus, thighs equal and parallel in flight, feet land soft and simultaneously at sh width with symmetrical placement. No pause between jumps and no excessive trunk motion in flight. The above athlete will be Discharged from formal physical therapy when goals are met and will begin the athletic Bridge Program here at [facility]. This program will be facilitated by Inocencio Sotomayor ATC. The patient will be progressed each week moving toward integrating into athletic practices. The length of this program will depend on many variables including: time from surgery, the athletes tolerance, strength/ability to proceed with proper mechanics, quality of movement patterns, strength testing, agility skills and psychological readiness to compete. When deemed appropriate, the athlete will be assessed and this information will be sent to the surgeon. If approved, the athlete will return to athletic practices that will likely be modified by the ATC for that sport. Goal Status: [] Achieved [] Partially Achieved  [x] Not Achieved, established today     Assessment:  Rehab Potential:   [x] Excellent [] Good [] Fair  [] Poor     Education on: Reviewed surgical procedure and preacautions    Plan:  Will see 2X per week for 4 weeks then re-assess. Follow attached protocol    [x] Therapeutic Exercise  [x] Modalities:  [] Ultrasound [] Electrical Stimulation [] Cervical Traction   [x] Therapeutic Activity        [x] Gait Training        [x] Neuromuscular Re-education   [x] Instruction in HEP        [] Manual Therapy        [] Aquatic Therapy                               [] Vasopneumatic   [x] Other    Physical Therapy Certification/Re-Certification Form    Dr Robby Bardales   The following patient has been evaluated for physical therapy services and for therapy to continue, insurance requires physician review of the treatment plan initially and every 90 days. Please review the attached evaluation and/or summary of the patient's plan of care, and verify that you agree therapy should continue by signing the attached document and sending it back to our office. Patient agrees with established plan of care and assisted in the development of their short term and long term goals. Patient had no adverse reaction with initial treatment and there are no barriers to learning. Demonstrates no mental or cognitive disorder. 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 If we are requesting more visits, we fully anticipate the patient's condition is expected to improve within the treatment timeframe we are requesting. Patient Status:          __X__Initial POC     _____Re-Assessment, recommend additional visits     _____Hold therapy for MD visit     _____Discharge      Electronically signed by:  Sanjay Pathak PT, 3/10/2021, 10:32 AM    If you have any questions or concerns, please don't hesitate to call.  Thank you for your referral.    Physician Signature:__________________________________ Date:______ Time: ________  By signing above, therapists plan is approved by physician

## 2021-03-10 NOTE — FLOWSHEET NOTE
[facility]) Physical Therapy at [address], [facility], [address]  Phone: [phone]   Fax:[phone]         Physical Therapy Treatment Note  Date:  3/10/2021    Patient Name:  Rebecca Mehta      :  2000    MRN: [id number]    Diagnosis: L ACL revision BPTB + Med Men Repair + ALL autograft 21                  Surgeon: Yuridia Campos  Next Doctor Visit:       Insurance/Certification information:       Plan of care signed (Y/N): Pending  Changes to ambulatory summary sheet? 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 Patient History/Mechanism of Injury: Pt tore R ACL hipolito year in [facility] . Had ACL ham graft , was released at 6 months. Came to [place] 2018 with no issues. Tore L ACL in 2019, had hamstring graft, did well in rehab, released to basketball in 2020. On  pt was at basketball practice and had contact with another player and was hit on the medial side of L knee. Pt felt two pops and pain. Surgery 21.       Subjective/Pain:    03-10-21   Pain two days after surgery was 6/10, but has slowly decreased since then. Today 2/10 at knee both medial and lateral to patellar tendon.   Pain worse at night.           Aggrevators:  WB             Relievers: rest    Objective: see eval      Exercises:   03-10-21      Visit #1      EVal     ROM          Ext 0     Flex 60           WarmUp                        Exercises      ES  10min     SLR Mod assist 4x5     GR 15min                                                                                                      Home Exercises Given: quad sets     Assessment:  Looks good for early postop    Treatment/Activity Tolerance:  [x] Patient tolerated treatment well [] Patient limited by fatigue  [] Patient limited by pain  [] Patient limited by other medical complications  [] Other:     Plan: Follow attached protocol    Time In/Time Out:   9040-5376                     Timed Code/Total Treatment Minutes:  Eval1 ES1    Electronically signed by:

## 2021-03-12 ENCOUNTER — HOSPITAL ENCOUNTER (OUTPATIENT)
Dept: PHYSICAL THERAPY | Age: 21
Setting detail: THERAPIES SERIES
Discharge: HOME | End: 2021-03-12
Payer: COMMERCIAL

## 2021-03-12 PROCEDURE — G0283 ELEC STIM OTHER THAN WOUND: HCPCS

## 2021-03-12 PROCEDURE — 97110 THERAPEUTIC EXERCISES: CPT

## 2021-03-12 NOTE — FLOWSHEET NOTE
[facility]) Physical Therapy at [address], [place], [address]  Phone: [phone]   Fax:[phone]         Physical Therapy Treatment Note  Date:  3/12/2021    Patient Name:  Mik Sommer      :  2000    MRN: [id number]    Diagnosis: L ACL revision BPTB + Med Men Repair + ALL autograft 21                  Surgeon: Naomi Fleischer  Next Doctor Visit:       Insurance/Certification information:       Plan of care signed (Y/N): Pending  Changes to ambulatory summary sheet? No    Summary of history from Evaluation:  Sport:  Basketball Vinny point guard  Patient Goal:  Pt wants to rehab as though she will play next season, though she isn't sure if she will play. Patient History/Mechanism of Injury: Pt tore R ACL vinny year in [employer] . Had ACL ham graft , was released at 6 months. Came to [place] 2018 with no issues. Tore L ACL in 2019, had hamstring graft, did well in rehab, released to basketball in 2020. On  pt was at basketball practice and had contact with another player and was hit on the medial side of L knee. Pt felt two pops and pain. Surgery 21.       Subjective/Pain:    03-10-21 03-12-21   Pain two days after surgery was 6/10, but has slowly decreased since then. Today 2/10 at knee both medial and lateral to patellar tendon. Pain worse at night.  Ant medial 2/10 pain   Aggrevators:  WB             Relievers: rest    Objective: 0-65, pain laterally along ALL incision      Exercises:   03-10-21 03-12-21     Visit #1 Visit#2     EVal     ROM          Ext 0 0    Flex 60 65          WarmUp      Bolster Hang  8min    Sitting edge of table  5 min    LAQ  90-40 x30    Exercises      ES  10min 10min    SLR Mod assist 4x5 IND mild lag 3x10    GR 15min                                                                                                      Home Exercises Given: quad sets, bend a few times a day    Assessment:  Tight flexion    Treatment/Activity Tolerance:  [x] Patient tolerated treatment well [] Patient limited by fatigue  [] Patient limited by pain  [] Patient limited by other medical complications  [] Other:     Plan: Follow attached protocol    Time In/Time Out:   9594-9512                   Timed Code/Total Treatment Minutes:  ES1 TE2    Electronically signed by:

## 2021-03-16 ENCOUNTER — HOSPITAL ENCOUNTER (OUTPATIENT)
Dept: PHYSICAL THERAPY | Age: 21
Setting detail: THERAPIES SERIES
Discharge: HOME | End: 2021-03-16
Payer: COMMERCIAL

## 2021-03-16 PROCEDURE — 97110 THERAPEUTIC EXERCISES: CPT

## 2021-03-16 PROCEDURE — G0283 ELEC STIM OTHER THAN WOUND: HCPCS

## 2021-03-16 NOTE — FLOWSHEET NOTE
[facility]) Physical Therapy at [address] [place], [address]  Phone: [phone]   Fax:[phone]         Physical Therapy Treatment Note  Date:  3/16/2021    Patient Name:  Bib Guerrero      :  2000    MRN: [id number]    Diagnosis: L ACL revision BPTB + Med Men Repair + ALL autograft 21                  Surgeon: Anurag Moreau  Next Doctor Visit:       Insurance/Certification information:       Plan of care signed (Y/N): Pending  Changes to ambulatory summary sheet? No    Summary of history from Evaluation:  Sport:  Basketball Vinny point guard  Patient Goal:  Pt wants to rehab as though she will play next season, though she isn't sure if she will play. Patient History/Mechanism of Injury: Pt tore R ACL vinny year in [facility] . Had ACL ham graft , was released at 6 months. Came to [place] 2018 with no issues. Tore L ACL in 2019, had hamstring graft, did well in rehab, released to basketball in 2020. On  pt was at basketball practice and had contact with another player and was hit on the medial side of L knee. Pt felt two pops and pain. Surgery 21.       Subjective/Pain:    03-10-21 03-12-21 03-16-21   Pain two days after surgery was 6/10, but has slowly decreased since then. Today 2/10 at knee both medial and lateral to patellar tendon. Pain worse at night.  Ant medial 2/10 pain Post knee pain since last visit 3/10   Aggrevators:  WB             Relievers: rest    Objective: 0-65, pain laterally along ALL incision      Exercises:  21    Visit#3    3/19=3wks   ROM        Ext 0   Flex 74       WarmUp    Bolster Hang 5min with rolling, then foot on wall   Sitting edge of table 2min   LAQ x30   Exercises    ES  10min   SLR IND mild lag 3x10   Supine TKE Red x30   Weight shifting  x30   Calf Raises DL x30   GR 15min                                 Home Exercises Given: quad sets, bend a few times a day, drop lock sitting to 70, cont PWB    Assessment:  Tight flexion    Treatment/Activity Tolerance:  [x] Patient tolerated treatment well [] Patient limited by fatigue  [] Patient limited by pain  [] Patient limited by other medical complications  [] Other:     Plan: Follow attached protocol    Time In/Time Out:   4355-9905                   Timed Code/Total Treatment Minutes:  ES1 TE2    Electronically signed by:

## 2021-03-19 ENCOUNTER — HOSPITAL ENCOUNTER (OUTPATIENT)
Dept: PHYSICAL THERAPY | Age: 21
Setting detail: THERAPIES SERIES
Discharge: HOME | End: 2021-03-19
Payer: COMMERCIAL

## 2021-03-19 PROCEDURE — 97110 THERAPEUTIC EXERCISES: CPT

## 2021-03-19 PROCEDURE — G0283 ELEC STIM OTHER THAN WOUND: HCPCS

## 2021-03-23 ENCOUNTER — HOSPITAL ENCOUNTER (OUTPATIENT)
Dept: PHYSICAL THERAPY | Age: 21
Setting detail: THERAPIES SERIES
Discharge: HOME | End: 2021-03-23
Payer: COMMERCIAL

## 2021-03-23 PROCEDURE — 97110 THERAPEUTIC EXERCISES: CPT

## 2021-03-23 PROCEDURE — G0283 ELEC STIM OTHER THAN WOUND: HCPCS

## 2021-03-23 NOTE — FLOWSHEET NOTE
[facility]) Physical Therapy at [address], [place], [address]  Phone: [phone]   Fax:[phone]         Physical Therapy Treatment Note  Date:  3/23/2021    Patient Name:  Daniel Mean      :  2000    MRN: [id number]    Diagnosis: L ACL revision BPTB + Med Men Repair + ALL autograft 21                  Surgeon: Andrew Vieyra  Next Doctor Visit:       Insurance/Certification information:       Plan of care signed (Y/N): Pending  Changes to ambulatory summary sheet? No    Summary of history from Evaluation:  Sport:  Basketball Vinny point guard  Patient Goal:  Pt wants to rehab as though she will play next season, though she isn't sure if she will play. Patient History/Mechanism of Injury: Pt tore R ACL vinny year in [facility] 2016. Had ACL ham graft , was released at 6 months. Came to [place] 2018 with no issues. Tore L ACL in 2019, had hamstring graft, did well in rehab, released to basketball in 2020. On  pt was at basketball practice and had contact with another player and was hit on the medial side of L knee. Pt felt two pops and pain. Surgery 21.       Subjective/Pain:  No pain  03-10-21 03-12-21 03-16-21 03-19-21   Pain two days after surgery was 6/10, but has slowly decreased since then. Today 2/10 at knee both medial and lateral to patellar tendon. Pain worse at night. Ant medial 2/10 pain Post knee pain since last visit 3/10 No pain   Aggrevators:  WB             Relievers: rest    Objective:       Exercises:  21    Visit#3 Visit#4 Visit#5    3/19=3wks  3/26=4wks   ROM          Ext 0 0 0   Flex 74 90 90         WarmUp      Bolster Hang 5min with rolling, then foot on wall Wall stretch   Prone stretch [id number] manual Wall stretch to 90   LAQ x30 90-40 x30 90-40 x30   Exercises      ES  10min 10min 10 min   SLR IND mild lag 3x10 IND no lag!   2x15 1# 2x15   sidelying hip abd  x30 1# 2x15   Supine TKE Red x30 Red x30 x30   Weight shifting  x30 x30 x30   Calf Raises DL x30 DLx30 DL x30   Bosu standing   DL [id number] DL [id number]   GR 15min 15min 15min   isometrics  Next visit  60deg 10 sec hold x10                                         Home Exercises Given: quad sets, bend a few times a day, drop lock sitting to 90, cont PWB    Assessment:  Improving flexion    Treatment/Activity Tolerance:  [x] Patient tolerated treatment well [] Patient limited by fatigue  [] Patient limited by pain  [] Patient limited by other medical complications  [] Other:     Plan: Follow attached protocol    Time In/Time Out:   6107-6347                   Timed Code/Total Treatment Minutes:  ES1 TE2    Electronically signed by:

## 2021-03-26 ENCOUNTER — HOSPITAL ENCOUNTER (OUTPATIENT)
Dept: PHYSICAL THERAPY | Age: 21
Setting detail: THERAPIES SERIES
Discharge: HOME | End: 2021-03-26
Payer: COMMERCIAL

## 2021-03-26 PROCEDURE — G0283 ELEC STIM OTHER THAN WOUND: HCPCS

## 2021-03-26 PROCEDURE — 97110 THERAPEUTIC EXERCISES: CPT

## 2021-03-26 NOTE — FLOWSHEET NOTE
[facility]) Physical Therapy at [address], [place], [address]  Phone: [phone]   Fax:[phone]         Physical Therapy Treatment Note  Date:  3/26/2021    Patient Name:  Glenn Perales      :  2000    MRN: [id number]    Diagnosis: L ACL revision BPTB + Med Men Repair + ALL autograft 21                  Surgeon: Jose Lui  Next Doctor Visit:       Insurance/Certification information:       Plan of care signed (Y/N): Pending  Changes to ambulatory summary sheet? No    Summary of history from Evaluation:  Sport:  Basketball Vinny point guard  Patient Goal:  Pt wants to rehab as though she will play next season, though she isn't sure if she will play. Patient History/Mechanism of Injury: Pt tore R ACL vinny year in [facility] 2016. Had ACL ham graft , was released at 6 months. Came to [place] 2018 with no issues. Tore L ACL in 2019, had hamstring graft, did well in rehab, released to basketball in 2020. On  pt was at basketball practice and had contact with another player and was hit on the medial side of L knee. Pt felt two pops and pain. Surgery 21.       Subjective/Pain:  No pain  03-10-21 03-12-21 03-16-21 03-19-21   Pain two days after surgery was 6/10, but has slowly decreased since then. Today 2/10 at knee both medial and lateral to patellar tendon. Pain worse at night.  Ant medial 2/10 pain Post knee pain since last visit 3/10 No pain   Aggrevators:  WB             Relievers: rest    Objective:       Exercises:  21    Visit#3 Visit#4 Visit#5 Visit#6    3/19=3wks  3/26=4wks 3/26=4wks   ROM           Ext 0 0 0 0   Flex 74 90 90 100          WarmUp       Bike 5min with rolling, then foot on wall Wall stretch   Prone stretch [id number] manual Wall stretch to 90 5 min fwd bkwd    LAQ x30 90-40 x30 90-40 x30 1#x30   Exercises       ES  10min 10min 10 min 10min   SLR IND mild lag 3x10 IND no lag! 2x15 1# 2x15 1#x30   sidelying hip abd  x30 1# 2x15 1#x30   TKE Red x30 Red x30 x30 standing   Weight shifting  x30 x30 x30 On tramp + toss x30   Calf Raises DL x30 DLx30 DL x30 Off step DL x30   Bosu standing   DL [id number] DL [id number] DL tossups [id number]   SL stance     On ground next rx   Hamstring ball holds    At 90 deg next rx   isometrics  Next visit  60deg 10 sec hold x10 60deg 10sec hold x10   GR    Not working, used ice bags 15 min                                        Home Exercises Given: to see MD next week.     Assessment:  Improving flexion    Treatment/Activity Tolerance:  [x] Patient tolerated treatment well [] Patient limited by fatigue  [] Patient limited by pain  [] Patient limited by other medical complications  [] Other:     Plan: Follow attached protocol    Time In/Time Out:   9822-1492                   Timed Code/Total Treatment Minutes:  ES1 TE2    Electronically signed by:

## 2021-03-30 ENCOUNTER — HOSPITAL ENCOUNTER (OUTPATIENT)
Dept: PHYSICAL THERAPY | Age: 21
Setting detail: THERAPIES SERIES
Discharge: HOME | End: 2021-03-30
Payer: COMMERCIAL

## 2021-03-30 PROCEDURE — 97112 NEUROMUSCULAR REEDUCATION: CPT

## 2021-03-30 PROCEDURE — 97110 THERAPEUTIC EXERCISES: CPT

## 2021-03-30 NOTE — FLOWSHEET NOTE
[facility]) Physical Therapy at [address], [place], [address]  Phone: [phone]   Fax:[phone]         Physical Therapy Treatment Note  Date:  3/30/2021    Patient Name:  Gil Adler      :  2000    MRN: [id number]    Diagnosis: L ACL revision BPTB + Med Men Repair + ALL autograft 21                  Surgeon: Shantelle Manning Doctor Visit:       Insurance/Certification information:       Plan of care signed (Y/N): Pending  Changes to ambulatory summary sheet? No    Summary of history from Evaluation:  Sport:  Basketball Vinny point guard  Patient Goal:  Pt wants to rehab as though she will play next season, though she isn't sure if she will play. Patient History/Mechanism of Injury: Pt tore R ACL vinny year in [facility] 2016. Had ACL ham graft , was released at 6 months. Came to [place] 2018 with no issues. Tore L ACL in 2019, had hamstring graft, did well in rehab, released to basketball in 2020. On  pt was at basketball practice and had contact with another player and was hit on the medial side of L knee. Pt felt two pops and pain. Surgery 21.       Subjective/Pain:  No pain  03-10-21 03-12-21 03-16-21 03-19-21   Pain two days after surgery was 6/10, but has slowly decreased since then. Today 2/10 at knee both medial and lateral to patellar tendon. Pain worse at night.  Ant medial 2/10 pain Post knee pain since last visit 3/10 No pain   Aggrevators:  WB             Relievers: rest    Objective:       Exercises:  21 L ACL 21    Visit#5 Visit#6 Visit#7    3/26=4wks 3/26=4wks =5wks   ROM          Ext 0 0    Flex 90 100 Tight 101 after 15 min stretching         WarmUp      Bike Wall stretch to 90 5 min fwd bkwd  5min   LAQ 90-40 x30 1#x30    Exercises      ES  10 min 10min    SLR 1# 2x15 1#x30 1#   sidelying hip abd 1# 2x15 1#x30    TKE Supine x30 standing standing   Weight shifting  x30 On tramp + toss x30 On tramp + chest pass   Calf Raises DL x30 Off step DL x30 Off step DLx30   Bosu standing  DL [id number] DL tossups [id number] DL x30 basketball tosses   SL stance   On ground next rx On ground SL tossups   Hamstring ball holds  At 90 deg wk 6 Week 6   Ball Plank  Next rx [id number]   isometrics 60deg 10 sec hold x10 60deg 10sec hold x10 10sec   Biceps   In chair 10# 2x10   Triceps   Red TB x30   Table pushups  R leg only next rx 2x15         GR  Not working, used ice bags 15 min ice     Home Exercises Given: to see MD next week.     Assessment:  tight flexion    Treatment/Activity Tolerance:  [x] Patient tolerated treatment well [] Patient limited by fatigue  [] Patient limited by pain  [] Patient limited by other medical complications  [] Other:     Plan: Follow attached protocol    Time In/Time Out:   6716-2595                   Timed Code/Total Treatment Minutes:   TE2 N1    Electronically signed by:

## 2021-04-01 ENCOUNTER — HOSPITAL ENCOUNTER (OUTPATIENT)
Dept: PHYSICAL THERAPY | Age: 21
Setting detail: THERAPIES SERIES
Discharge: HOME | End: 2021-04-01
Payer: COMMERCIAL

## 2021-04-01 PROCEDURE — 97112 NEUROMUSCULAR REEDUCATION: CPT

## 2021-04-01 PROCEDURE — 97110 THERAPEUTIC EXERCISES: CPT

## 2021-04-01 NOTE — FLOWSHEET NOTE
800   Physical Therapy at [address], [place], [address]  Phone: [phone]   Fax:[phone]         Physical Therapy Treatment Note  Date:  2021    Patient Name:  Ruma Leal      :  2000    MRN: [id number]    Diagnosis: L ACL revision BPTB + Med Men Repair + ALL autograft 21                  Surgeon: Tate Mancia  Next Doctor Visit:       Insurance/Certification information:       Plan of care signed (Y/N): Pending  Changes to ambulatory summary sheet? No    Summary of history from Evaluation:  Sport:  Basketball Vinny point guard  Patient Goal:  Pt wants to rehab as though she will play next season, though she isn't sure if she will play. Patient History/Mechanism of Injury: Pt tore R ACL vinny year in [employer] . Had ACL ham graft , was released at 6 months. Came to [place] 2018 with no issues. Tore L ACL in 2019, had hamstring graft, did well in rehab, released to basketball in 2020. On  pt was at basketball practice and had contact with another player and was hit on the medial side of L knee. Pt felt two pops and pain. Surgery 21.       Subjective/Pain:  0-2/10    Objective: TIGHT FLEXION      Exercises:  21 L ACL 21    Visit#8    4/2=5wks   ROM        Ext 0   Flex 105 after 15 min stretching       WarmUp    Bike 5min   LAQ 2#90-40 x30   Exercises    SLR 2#x30   sidelying hip abd 2#x30   TKE x30   Weight shifting  x30   Calf Raises DL x30   Bosu standing  DL x30 basketball tosses   SL stance  SL airex tossups   Hamstring ball holds Wk 6   [address] [facility] [id number]   Xcerion with L leg ext 7#x30   isometrics x10   Biceps in chair 12# 2x10   Triceps in chair Red TB x30   Table pushups 2x15   GR GR 15     Home Exercises Given: to see MD next week.     Assessment:  tight flexion PUSH FLEXION    Treatment/Activity Tolerance:  [x] Patient tolerated treatment well [] Patient limited by fatigue  [] Patient limited by pain  [] Patient limited by other medical complications  [] Other:     Plan: Follow attached protocol    Time In/Time Out:   3707-4532                  Timed Code/Total Treatment Minutes:   TE2 N1    Electronically signed by:

## 2021-04-06 ENCOUNTER — HOSPITAL ENCOUNTER (OUTPATIENT)
Dept: PHYSICAL THERAPY | Age: 21
Setting detail: THERAPIES SERIES
Discharge: HOME | End: 2021-04-06
Payer: COMMERCIAL

## 2021-04-06 PROCEDURE — 97110 THERAPEUTIC EXERCISES: CPT

## 2021-04-06 PROCEDURE — 97112 NEUROMUSCULAR REEDUCATION: CPT

## 2021-04-09 ENCOUNTER — HOSPITAL ENCOUNTER (OUTPATIENT)
Dept: PHYSICAL THERAPY | Age: 21
Setting detail: THERAPIES SERIES
Discharge: HOME | End: 2021-04-09
Payer: COMMERCIAL

## 2021-04-09 PROCEDURE — 97110 THERAPEUTIC EXERCISES: CPT

## 2021-04-09 PROCEDURE — 97112 NEUROMUSCULAR REEDUCATION: CPT

## 2021-04-09 NOTE — FLOWSHEET NOTE
[facility]) Physical Therapy at [address], [facility], [address]  Phone: [phone]   Fax:[phone]         Physical Therapy Treatment Note  Date:  2021    Patient Name:  Stephanie Martinez      :  2000    MRN: [id number]    Diagnosis: L ACL revision BPTB + Med Men Repair + ALL autograft 21                  Surgeon: Joceline Quezada  Next Doctor Visit:       Insurance/Certification information:       Plan of care signed (Y/N): Pending  Changes to ambulatory summary sheet? 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 Patient History/Mechanism of Injury: Pt tore R ACL hipolito year in [facility] 2016. Had ACL ham graft , was released at 6 months. Came to [place] 2018 with no issues. Tore L ACL in 2019, had hamstring graft, did well in rehab, released to basketball in 2020. On  pt was at basketball practice and had contact with another player and was hit on the medial side of L knee. Pt felt two pops and pain. Surgery 21.     Subjective/Pain:  After prolonged sitting knee feels stiff and aches 2/10. Saw Joceline Quezada yesterday and brace/crutches Discharged. Said to stick to protocol. Likely return to basketball at 9 months but will re-assess after bridge. Return in 6 weeks.      Objective: TIGHT FLEXION 115 today    Exercises:  21 L ACL 21    Visit#10       ROM        Ext 0   Flex 117       WarmUp    Elliptical  5min   TM fwd and bkwd NT   Bike  5min   LAQ 3#x30   Exercises    Hamstring ball holds Standing x60sec   Fwd Step Up 4in+airex +dribblex3 20reps   Lateral Step down NT   SL shuttle  1Cx30   Halo 60sec hold   Bosu squat holds 40deg 60sec +vball toss   SL black bosu+back kick +dribble x1 30reps   Lunges  Wk 7   Side stepping  NT   SL chair sit Wk 9       FITNESS CENTER     Calf

## 2021-04-13 ENCOUNTER — HOSPITAL ENCOUNTER (OUTPATIENT)
Dept: PHYSICAL THERAPY | Age: 21
Setting detail: THERAPIES SERIES
Discharge: HOME | End: 2021-04-13
Payer: COMMERCIAL

## 2021-04-13 PROCEDURE — 97530 THERAPEUTIC ACTIVITIES: CPT

## 2021-04-13 PROCEDURE — 97110 THERAPEUTIC EXERCISES: CPT

## 2021-04-13 PROCEDURE — 97112 NEUROMUSCULAR REEDUCATION: CPT

## 2021-04-13 NOTE — FLOWSHEET NOTE
[facility]) Physical Therapy at [address], [facility] [place], [address]  Phone: [phone]   Fax:[phone]         Physical Therapy Treatment Note  Date:  2021    Patient Name:  Blaise Bennett      :  2000    MRN: [id number]    Diagnosis: L ACL revision BPTB + Med Men Repair + ALL autograft 21                  Surgeon: Andreea Rangel  Next Doctor Visit:       Insurance/Certification information:       Plan of care signed (Y/N): Pending  Changes to ambulatory summary sheet? No    Summary of history from Evaluation:  Sport:  Basketball Vinny point guard  Patient Goal:  Pt wants to rehab as though she will play next season, though she isn't sure if she will play. Patient History/Mechanism of Injury: Pt tore R ACL vinny year in [facility] 2016. Had ACL ham graft , was released at 6 months. Came to [place] 2018 with no issues. Tore L ACL in 2019, had hamstring graft, did well in rehab, released to basketball in 2020. On  pt was at basketball practice and had contact with another player and was hit on the medial side of L knee. Pt felt two pops and pain. Surgery 21.     Subjective/Pain:  After prolonged sitting knee feels stiff and aches 2/10. Saw Andreea Rangel and brace/crutches Discharged. Said to stick to protocol. Likely return to basketball at 9 months but will re-assess after bridge. Return in 6 weeks.      Objective: TIGHT FLEXION 115 today    Exercises:  21 L ACL 21    Visit#11   ROM     =7wks   Ext 0 after stretching   Flex 120       WarmUp    Elliptical  5min   Bike  5min   hamtring ball holds  Green ball 60sec hold each side   Extension hang 5# 5min   Bkwd TM 5% inclince 2.0mph 5 min   SLR Touch calf x30 clinic 3#   Exercises    Fwd Step Up 6in+airex +dribble4 x20 reps   Lateral Step down 4in 2x15   SL shuttle     Halo x15 alt leg   Bosu squat holds 40deg 60sec +vball toss   SL black bosu+back kick +dribble x1 30reps Lunges  Wk 7   Side stepping  NT   SL chair sit Wk 9       FITNESS CENTER     Calf Raises SL off step 2x15   4way hip abduction 55#x15   4way hip extension + ham 40#x15       TABLE    SLR 3#x30   Side hip abduction at wall  3#x30   Clam Shell  Black x30       HEP    Ball Plank [id number]   [place] with L leg ext 10#x30   Biceps in chair 12# 2x10   Triceps in chair Blue TB x30   Table pushups 2x15         Home Exercises Given: see above ex 4x per week     Assessment:  Cont to work on flexion AND extension    Treatment/Activity Tolerance:  [x] Patient tolerated treatment well [] Patient limited by fatigue  [] Patient limited by pain  [] Patient limited by other medical complications  [] Other:     Plan: Follow attached protocol    Time In/Time Out:   2369-8060                Timed Code/Total Treatment Minutes:   TE2 N1 TA1    Electronically signed by:

## 2021-04-16 ENCOUNTER — HOSPITAL ENCOUNTER (OUTPATIENT)
Dept: PHYSICAL THERAPY | Age: 21
Setting detail: THERAPIES SERIES
Discharge: HOME | End: 2021-04-16
Payer: COMMERCIAL

## 2021-04-16 PROCEDURE — 97112 NEUROMUSCULAR REEDUCATION: CPT

## 2021-04-16 PROCEDURE — 97110 THERAPEUTIC EXERCISES: CPT

## 2021-04-16 PROCEDURE — 97530 THERAPEUTIC ACTIVITIES: CPT

## 2021-04-16 NOTE — FLOWSHEET NOTE
[facility]) Physical Therapy at [address], [facility], [address]  Phone: [phone]   Fax:[phone]         Physical Therapy Treatment Note  Date:  2021    Patient Name:  Koko Franklin      :  2000    MRN: [id number]    Diagnosis: L ACL revision BPTB + Med Men Repair + ALL autograft 21                  Surgeon: Hebert Quinones  Next Doctor Visit:       Insurance/Certification information:       Plan of care signed (Y/N): Pending  Changes to ambulatory summary sheet? 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 Patient History/Mechanism of Injury: Pt tore R ACL hipolito year in [facility] 2016. Had ACL ham graft , was released at 6 months. Came to [facility] 2018 with no issues. Tore L ACL in 2019, had hamstring graft, did well in rehab, released to basketball in 2020. On  pt was at basketball practice and had contact with another player and was hit on the medial side of L knee. Pt felt two pops and pain. Surgery 21.     Subjective/Pain:  After prolonged sitting knee feels stiff and aches 2/10. Saw Hebert Quinones and brace/crutches Discharged. Said to stick to protocol. Likely return to basketball at 9 months but will re-assess after bridge. Return in 6 weeks. Objective: TIGHT EXTENSION, FOCUS MORE ON EXTENSION.   FLEXION IMPROVED    Exercises:  21 L ACL+med men+all 21    Visit#11 Visit#12   ROM     =7wks =7wks   Ext 0 after stretching 0 AFTER 25 MIN OF STRETCHING   Flex 120 125 after bike        WarmUp     Elliptical  5min 5min   Bike  5min 5min   hamtring ball holds  Green ball 60sec hold each side [id number] ea side   Extension hang 5# 5min 5#x5min   Bkwd TM 5% inclince 2.0mph 5 min HEP   SLR Touch calf x30 clinic 3# 3#x30   Exercises     Fwd Step Up 6in+airex +dribble4 x20 reps 6in+airex +dribble4 x20 reps   Lateral Step down 4in 2x15 6in 2x15   SL shuttle   2C X30   Halo x15 alt leg    Bosu squat holds 40deg 60sec +vball toss 0deg 60sec +vball toss   SL black bosu+back kick +dribble x1 30reps +dribble x1 30reps   Lunges  Wk 7 +6in x15 andrea   Side stepping  NT NT   SL chair sit Wk 9 Wk 9        FITNESS CENTER      Calf Raises SL off step 2x15 SL off step 2x15   4way hip abduction 55#x15 55#x15   4way hip extension + ham 40#x15 55#x15        TABLE     SLR 3#x30 3#x30   Side hip abduction at wall  3#x30 3#x30   Clam Shell  Black x30 Black x30        HEP     Ball Plank 2q59xce HEP   [place] with L leg ext 10#x30    Biceps in chair 12# 2x10    Triceps in chair Blue TB x30    Table pushups 2x15           Home Exercises Given: see above ex 4x per week     Assessment:  Cont to work on flexion AND extension    Treatment/Activity Tolerance:  [x] Patient tolerated treatment well [] Patient limited by fatigue  [] Patient limited by pain  [] Patient limited by other medical complications  [] Other:     Plan: Follow attached protocol    Time In/Time Out:   6992-7932                Timed Code/Total Treatment Minutes:   TE1 N1 TA1    Electronically signed by:

## 2021-04-20 ENCOUNTER — HOSPITAL ENCOUNTER (OUTPATIENT)
Dept: PHYSICAL THERAPY | Age: 21
Setting detail: THERAPIES SERIES
Discharge: HOME | End: 2021-04-20
Payer: COMMERCIAL

## 2021-04-20 PROCEDURE — 97530 THERAPEUTIC ACTIVITIES: CPT

## 2021-04-20 PROCEDURE — 97110 THERAPEUTIC EXERCISES: CPT

## 2021-04-20 PROCEDURE — 97112 NEUROMUSCULAR REEDUCATION: CPT

## 2021-04-20 NOTE — FLOWSHEET NOTE
[facility]) Physical Therapy at [address], [place], [address]  Phone: [phone]   Fax:[phone]         Physical Therapy Treatment Note  Date:  2021    Patient Name:  Concepcion Alonzo      :  2000    MRN: [id number]    Diagnosis: L ACL revision BPTB + Med Men Repair + ALL autograft 21                  Surgeon: Ghulam Cabral  Next Doctor Visit:       Insurance/Certification information:       Plan of care signed (Y/N): Pending  Changes to ambulatory summary sheet? No    Summary of history from Evaluation:  Sport:  Basketball Vinny point guard  Patient Goal:  Pt wants to rehab as though she will play next season, though she isn't sure if she will play. Patient History/Mechanism of Injury: Pt tore R ACL vinny year in [facility] . Had ACL ham graft , was released at 6 months. Came to [place] 2018 with no issues. Tore L ACL in 2019, had hamstring graft, did well in rehab, released to basketball in 2020. On  pt was at basketball practice and had contact with another player and was hit on the medial side of L knee. Pt felt two pops and pain. Surgery 21.     Subjective/Pain:  After prolonged sitting knee feels stiff and aches 2/10. Saw Ghulam Cabral and brace/crutches Discharged. Said to stick to protocol. Likely return to basketball at 9 months but will re-assess after bridge. Return in 6 weeks. Objective: TIGHT EXTENSION, FOCUS MORE ON EXTENSION.   FLEXION IMPROVED    Exercises:  21 L ACL+med men+all 21    Visit#11 Visit#12 Visit#13   ROM     =7wks =7wks =8wks   Ext 0 after stretching 0 AFTER 25 MIN OF STRETCHING 0 after 10 min stretching   Flex 120 125 after bike 125   WarmUp      Elliptical  5min 5min 5min   Bike  5min 5min 5min   hamtring ball holds  Green ball 60sec hold each side [id number] ea side x60sec   Extension hang 5# 5min 5#x5min 5#x5min   Bkwd TM 5% inclince 2.0mph 5 min HEP HEP   Exercises      Fwd Step Up 6in+airex +dribble4 x20 reps 6in+airex +dribble4 x20 reps 10in+airex dribble4  x20   Lateral Step down 4in 2x15 6in 2x15 6in 2x15   SL shuttle   2C X30 2Cx30   Halo x15 alt leg  x15   Bosu squat holds 40deg 60sec +vball toss 0deg 60sec +vball toss 0deg 60sec +vball toss   SL black bosu+back kick +dribble x1 30reps +dribble x1 30reps SL black bosu+back kick   Lunges  Wk 7 +6in x15 andrea +3in Lfwd  +6in Rfwd   x20         Side stepping  NT NT x20 each way blue tb   SL chair sit Wk 9 Wk 9 Wk 9         FITNESS CENTER       Calf Raises SL off step 2x15 SL off step 2x15 SL off step 2x15   4way hip abduction 55#x15 55#x15 55#x15   4way hip extension + ham 40#x15 55#x15 55#x15         TABLE      SLR 3#x30 3#x30 3#x30   Side hip abduction at wall  3#x30 3#x30 3#x30   LAQ   3#x30   Clam Shell  Black x30 Black x30          HEP      Ball Plank [id number] HEP HEP [id number]   Ball Crunch with L leg ext 10#x30  HEP 10#x30   Biceps in chair 12# 2x10  HEP 12#2x10   Triceps in chair Blue TB x30  HEP TB x30   Table pushups 2x15  HEP 2x15           Home Exercises Given: see above ex 4x per week     Assessment:  Cont to work on flexion AND extension    Treatment/Activity Tolerance:  [x] Patient tolerated treatment well [] Patient limited by fatigue  [] Patient limited by pain  [] Patient limited by other medical complications  [] Other:     Plan: Follow attached protocol    Time In/Time Out:   2612-5348                Timed Code/Total Treatment Minutes:   TE1 N1 TA1    Electronically signed by:

## 2021-04-27 ENCOUNTER — HOSPITAL ENCOUNTER (OUTPATIENT)
Dept: PHYSICAL THERAPY | Age: 21
Setting detail: THERAPIES SERIES
Discharge: HOME | End: 2021-04-27
Payer: COMMERCIAL

## 2021-04-27 PROCEDURE — 97110 THERAPEUTIC EXERCISES: CPT

## 2021-04-27 PROCEDURE — 97530 THERAPEUTIC ACTIVITIES: CPT

## 2021-04-27 PROCEDURE — 97112 NEUROMUSCULAR REEDUCATION: CPT

## 2021-04-27 NOTE — FLOWSHEET NOTE
[facility] ([facility]) Physical Therapy at [address] [place], [address]  Phone: [phone]   Fax:[phone]         Physical Therapy Treatment Note  Date:  2021    Patient Name:  Stephanie Martinez      :  2000    MRN: [id number]    Diagnosis: L ACL revision BPTB + Med Men Repair + ALL autograft 21                  Surgeon: Joceline Quezada  Next Doctor Visit:       Insurance/Certification information:       Plan of care signed (Y/N): Pending  Changes to ambulatory summary sheet? No    Summary of history from Evaluation:  Sport:  Basketball Vinny point guard  Patient Goal:  Pt wants to rehab as though she will play next season, though she isn't sure if she will play. Patient History/Mechanism of Injury: Pt tore R ACL vinny year in [facility] 2016. Had ACL ham graft , was released at 6 months. Came to [place] 2018 with no issues. Tore L ACL in 2019, had hamstring graft, did well in rehab, released to basketball in 2020. On  pt was at basketball practice and had contact with another player and was hit on the medial side of L knee. Pt felt two pops and pain. Surgery 21.     Subjective/Pain:  After prolonged sitting knee feels stiff and aches 2/10. One episode of buckling yesterday in parking lot   [address] [place] [address] and brace/crutches Discharged. Said to stick to protocol. Likely return to basketball at 9 months but will re-assess after bridge. Return in 6 weeks. Objective: TIGHT EXTENSION, FOCUS MORE ON EXTENSION.   FLEXION IMPROVED    Exercises:  21 L ACL+med men+all 21    Visit#13 Visit#14   ROM     =8wks =9wks   Ext 0 after 10 min stretching 0 after 5 min stretch   Flex 125 130   WarmUp     Elliptical  5min 5min   Bike  5min 5min   hamtring ball holds  x60sec With cane pushup x30   Extension hang 5#x5min    Bkwd TM HEP    Exercises     Fwd Step Up 10in+airex dribble4  x20 10in+airex x20   Lateral Step down 6in 2x15 8in with band at lateral knee x15   SL shuttle  2Cx30 3Cx30   Halo x15 x15   Bosu squat holds 0deg 60sec +vball toss [id number]   SL RDL SL black bosu+back kick Black bosu + dribble x20   Lunges  +3in Lfwd  +6in Rfwd   x20 +3in x30        Side stepping  x20 each way blue tb x20 black tb    SL chair sit Wk 9 Wk 9   Lateral lunge  To chair +3in x15 bilateral 3 sec hold   FITNESS CENTER      Calf Raises SL off step 2x15 SL 1x30   4way hip abduction 55#x15 55#x15   4way hip extension + ham 55#x15 55#x15        TABLE     SLR 3#x30 3#x30   Side hip abduction at wall  3#x30 3#x30   LAQ 3#x30 3#x30   Clam Shell   [id number]        HEP     Ball Plank HEP [id number]   Ball Crunch with L leg ext HEP 10#x30 10#x30   Biceps in chair HEP 12#2x10 12#2x10   Triceps in chair HEP TB x30 Black TB x30   Table pushups HEP 2x15 Floor pushups x30          Home Exercises Given: see above ex 4x per week     Assessment:  Cont to work on flexion AND extension    Treatment/Activity Tolerance:  [x] Patient tolerated treatment well [] Patient limited by fatigue  [] Patient limited by pain  [] Patient limited by other medical complications  [] Other:     Plan: Follow attached protocol    Time In/Time Out:   4961-0288                Timed Code/Total Treatment Minutes:   TE1 N1 TA1    Electronically signed by:

## 2021-04-30 ENCOUNTER — HOSPITAL ENCOUNTER (OUTPATIENT)
Dept: PHYSICAL THERAPY | Age: 21
Setting detail: THERAPIES SERIES
Discharge: HOME | End: 2021-04-30
Payer: COMMERCIAL

## 2021-04-30 PROCEDURE — 97112 NEUROMUSCULAR REEDUCATION: CPT

## 2021-04-30 PROCEDURE — 97110 THERAPEUTIC EXERCISES: CPT

## 2021-04-30 PROCEDURE — 97530 THERAPEUTIC ACTIVITIES: CPT

## 2021-04-30 NOTE — FLOWSHEET NOTE
[facility] ([facility]) Physical Therapy at [address] [place], [address]  Phone: [phone]   Fax:[phone]         Physical Therapy Treatment Note  Date:  2021    Patient Name:  Addie Boucher      :  2000    MRN: [id number]    Diagnosis: L ACL revision BPTB + Med Men Repair + ALL autograft 21                  Surgeon: Justina Pena  Next Doctor Visit:       Insurance/Certification information:       Plan of care signed (Y/N): Pending  Changes to ambulatory summary sheet? No    Summary of history from Evaluation:  Sport:  Basketball Vinny point guard  Patient Goal:  Pt wants to rehab as though she will play next season, though she isn't sure if she will play. Patient History/Mechanism of Injury: Pt tore R ACL vinny year in [facility] 2016. Had ACL ham graft , was released at 6 months. Came to [place] 2018 with no issues. Tore L ACL in 2019, had hamstring graft, did well in rehab, released to basketball in 2020. On  pt was at basketball practice and had contact with another player and was hit on the medial side of L knee. Pt felt two pops and pain. Surgery 21.     Subjective/Pain:  After prolonged sitting knee feels stiff and aches 2/10. One episode of buckling yesterday in parking lot   [address] [place] [address] and brace/crutches Discharged. Said to stick to protocol. Likely return to basketball at 9 months but will re-assess after bridge. Return in 6 weeks. Objective: TIGHT EXTENSION, FOCUS MORE ON EXTENSION.   FLEXION IMPROVED    Exercises:  21 L ACL+med men+all 21    Visit#13 Visit#14 Visit#15   ROM     =8wks =9wks =9wks   Ext 0 after 10 min stretching 0 after 5 min stretch    Flex 125 130    WarmUp      Elliptical  5min 5min 5min   Bike  5min 5min 5min   Shuttle Air hops x60sec  0C x30   Agility 5#x5min  No rotation x60sec   Pre plyo to wall    Next rx   Happy feet    Next rx   Exercises Fwd Step Up 10in+airex dribble4  x20 10in+airex x20 16in+airex dribble4 x15   Lateral Step down 6in 2x15 8in with band at lateral knee x15 8in with band at lateral knee x15   Hamstring ball holds    +cane pushup x20   Halo x15 x15 x15 bilateral   Bosu squat holds 0deg 60sec +vball toss [id number]   SL RDL SL black bosu+back kick Black bosu + dribble x20 x20   Lunges  +3in Lfwd  +6in Rfwd   x20 +3in x30 +3in x30   Post reach   48in andrea   Side stepping  x20 each way blue tb x20 black tb  x30 black tb   SL chair sit Wk 9 Wk 9 +6in 2x15 good mechanics   Lateral lunge  To chair +3in x15 bilateral 3 sec hold x15 +3in x15   FITNESS CENTER       Calf Raises SL off step 2x15 SL 1x30 SL1x30   4way hip abduction 55#x15 55#x15 55#x15   4way hip extension + ham 55#x15 55#x15 55#x15   SL Press   15# x10   TABLE      SLR 3#x30 3#x30 3#x30   Side hip abduction at wall  3#x30 3#x30 3#x30   LAQ 3#x30 3#x30 3#x30   Clam Shell   [id number]         HEP      Ball Plank HEP [id number] HEP   Ball Crunch with L leg ext HEP 10#x30 10#x30 HEP   Biceps in chair HEP 12#2x10 12#2x10 HEP   Triceps in chair HEP TB x30 Black TB x30 HEP   Table pushups HEP 2x15 Floor pushups x30 HEP           Home Exercises Given: see above ex 4x per week     Assessment:  Cont to work on flexion AND extension    Treatment/Activity Tolerance:  [x] Patient tolerated treatment well [] Patient limited by fatigue  [] Patient limited by pain  [] Patient limited by other medical complications  [] Other:     Plan: Follow attached protocol    Time In/Time Out:   0605-5419                Timed Code/Total Treatment Minutes:   TE1 N1 TA1    Electronically signed by:

## 2021-05-03 ENCOUNTER — HOSPITAL ENCOUNTER (OUTPATIENT)
Dept: PHYSICAL THERAPY | Age: 21
Setting detail: THERAPIES SERIES
Discharge: HOME | End: 2021-05-03
Payer: COMMERCIAL

## 2021-05-03 PROCEDURE — 97530 THERAPEUTIC ACTIVITIES: CPT

## 2021-05-03 PROCEDURE — 97112 NEUROMUSCULAR REEDUCATION: CPT

## 2021-05-03 PROCEDURE — 97110 THERAPEUTIC EXERCISES: CPT

## 2021-05-03 NOTE — FLOWSHEET NOTE
[facility]) Physical Therapy at [address], [facility], [address]  Phone: [phone]   Fax:[phone]         Physical Therapy Treatment Note  Date:  5/3/2021    Patient Name:  Boris Simmons      :  2000    MRN: [id number]    Diagnosis: L ACL revision BPTB + Med Men Repair + ALL autograft 21                  Surgeon: Hugo Alejandra  Next Doctor Visit:       Insurance/Certification information:       Plan of care signed (Y/N): Pending  Changes to ambulatory summary sheet? No    Summary of history from Evaluation:  Sport:  Basketball Vinny point guard  Patient Goal:  Pt wants to rehab as though she will play next season, though she isn't sure if she will play. Patient History/Mechanism of Injury: Pt tore R ACL vinny year in [facility] . Had ACL ham graft , was released at 6 months. Came to [place] 2018 with no issues. Tore L ACL in 2019, had hamstring graft, did well in rehab, released to basketball in 2020. On  pt was at basketball practice and had contact with another player and was hit on the medial side of L knee. Pt felt two pops and pain. Surgery 21.     Subjective/Pain: 5/3=had more soreness after last visit. Limited advancements today due to 3-4/10 joint soreness after last visit   After prolonged sitting knee feels stiff and aches 10. Increased stiffness after last rx 4/10  Saw Jacquelyn and brace/crutches Discharged. Said to stick to protocol. Likely return to basketball at 9 months but will re-assess after bridge. Return in 6 weeks. Objective: flexion is 135 at the end of PT. Still a little tight due to ALL autograft. Extension equals other leg after minimal extension stretching at the end of PT. No limp.   Will cont to strictly adhere to protocol as this is ACL revision    Exercises:  21 L ACL+med men+all 21    Visit#15 Visit#16   ROM     =9wks =10wks   Ext     Flex  135 after PT WarmUp     Elliptical  5min 5min   Bike  5min 5min   Shuttle Air hops 0C x30 0Cx30   Agility No rotation x60sec If no pain do 5h65lil with rot next rx   Pre plyo to wall  Next rx Next week   Happy feet  Next rx Next rx   Exercises     Fwd Step Up 16in+airex dribble4 x15 16in+airex dribblex4 x15   Lateral Step down 8in with band at lateral knee x15 8in with band at lateral knee x15   Hamstring ball holds  +cane pushup x20 +cane pushup x20   Halo x15 bilateral x20 andrea   Bosu squat holds [id number] 0h46phf+vball pass   SL RDL x20 Black bosu + dribble x20   Lunges  +3in x30 +3in x30  Go to floor next rx   Post reach 48in andrea Push step bkwd   Side stepping  x30 black tb x30 black TB   SL chair sit +6in 2x15 good mechanics +6in 2x15   Lateral lunge x15 +3in x15 x15 +3in with toss, go to chair next rx   [facility] SL1x30 SL1x30   4way hip abduction 55#x15 55#x15   4way hip extension + ham 55#x15 55#x15   SL Press 15# x10 15#x10 watch for compensatory valgus   TABLE     SLR 3#x30 3#x30   Side hip abduction at wall  3#x30 3#x30   LAQ 3#x30 3#x30   Clam Shell  [id number] Black x30        HEP     Ball Plank HEP HEP   [place] with L leg ext HEP ^   Biceps in chair HEP ^   Triceps in chair HEP ^   Table pushups HEP ^          Home Exercises Given: see above ex 4x per week     Assessment:  Cont to work on flexion AND extension     Treatment/Activity Tolerance:  [x] Patient tolerated treatment well [] Patient limited by fatigue  [] Patient limited by pain  [] Patient limited by other medical complications  [] Other:     Plan: Follow attached protocol    Time In/Time Out:   3426-1550                Timed Code/Total Treatment Minutes:   TE1 N1 TA1    Electronically signed by:

## 2021-05-07 ENCOUNTER — HOSPITAL ENCOUNTER (OUTPATIENT)
Dept: PHYSICAL THERAPY | Age: 21
Setting detail: THERAPIES SERIES
Discharge: HOME | End: 2021-05-07
Payer: COMMERCIAL

## 2021-05-07 PROCEDURE — 97110 THERAPEUTIC EXERCISES: CPT

## 2021-05-07 PROCEDURE — 97530 THERAPEUTIC ACTIVITIES: CPT

## 2021-05-07 PROCEDURE — 97112 NEUROMUSCULAR REEDUCATION: CPT

## 2021-05-07 NOTE — FLOWSHEET NOTE
[facility]) Physical Therapy at [address], [place], [address]  Phone: [phone]   Fax:[phone]         Physical Therapy Treatment Note  Date:  2021    Patient Name:  William Dent      :  2000    MRN: [id number]    Diagnosis: L ACL revision BPTB + Med Men Repair + ALL autograft 21                  Surgeon: Satish Jarvis  Next Doctor Visit:       Insurance/Certification information:       Plan of care signed (Y/N): Pending  Changes to ambulatory summary sheet? No    Summary of history from Evaluation:  Sport:  Basketball Vinny point guard  Patient Goal:  Pt wants to rehab as though she will play next season, though she isn't sure if she will play. Patient History/Mechanism of Injury: Pt tore R ACL vinny year in [facility] 2016. Had ACL ham graft , was released at 6 months. Came to [place] 2018 with no issues. Tore L ACL in 2019, had hamstring graft, did well in rehab, released to basketball in 2020. On  pt was at basketball practice and had contact with another player and was hit on the medial side of L knee. Pt felt two pops and pain. Surgery 21.     Subjective/Pain: : pt report feels the same as last visit. Sore and stiff 3-4/10 pain. Pt reports buckling a few times with pt catching herself mostly on uneven surfaces I.e. grass  5/3=had more soreness after last visit. Limited advancements today due to 3-4/10 joint soreness after last visit   After prolonged sitting knee feels stiff and aches 2/10. Increased stiffness after last rx 4/10  Saw Jacquelyn and brace/crutches Discharged. Said to stick to protocol. Likely return to basketball at 9 months but will re-assess after bridge. Return in 6 weeks. Objective: Flexion: 130 actively, with gentle PROM easily 135 at end of session- tight due to ALL autograft.  No limp    Exercises:  21 L ACL+med men+all 21    Visit#15 Visit#16 Visit #17   ROM     4/30=9wks 5/7=10wks 5/7= 10 weeks   Ext      Flex  135 after PT    WarmUp      Elliptical  5min 5min 5 min   Bike  5min 5min 5 min   Shuttle Air hops 0C x30 0Cx30 0C x 30   Agility No rotation x60sec If no pain do [id number] with rot next rx No rotation 2 x 60 seconds   Pre plyo to wall  Next rx Next week Next week   Happy feet  Next rx Next rx Next rx   Exercises      Fwd Step Up 16in+airex dribble4 x15 16in+airex dribblex4 x15 16in + airex dribble 4 x 15   Lateral Step down 8in with band at lateral knee x15 8in with band at lateral knee x15 8 in x 15   Hamstring ball holds  +cane pushup x20 +cane pushup x20 + cane push up x 20 andrea   Halo x15 bilateral x20 andrea X 20 andrea   Bosu squat holds [id number]+vball pass 2 x 60 sec with vb pass   SL RDL x20 Black bosu + dribble x20 Black bosu with dribble x 20 andrea   Lunges  +3in x30 +3in x30  Go to floor next rx X 30 andrea to floor pt unable    Post reach 48in andrea Push step bkwd NT   Side stepping  x30 black tb x30 black TB NT   SL chair sit +6in 2x15 good mechanics +6in 2x15 +6in 2 x 15 SL   Lateral lunge x15 +3in x15 x15 +3in with toss, go to chair next rx X 15 + 3in with toss to chair   FITNESS CENTER       Calf Raises SL1x30 SL1x30 SL x 30   4way hip abduction 55#x15 55#x15 55# x 15   4way hip extension + ham 55#x15 55#x15 55# x 15   SL Press 15# x10 15#x10 watch for compensatory valgus 15 x 10    TABLE      SLR 3#x30 3#x30 3# x 30   Side hip abduction at wall  3#x30 3#x30 3# x 30   LAQ 3#x30 3#x30 3# x 30   Clam Shell  [id number] Black x30 NT         HEP      Ball Plank HEP HEP HEP   Ball Crunch with L leg ext HEP ^ HEP   Biceps in chair HEP ^ HEP   Triceps in chair HEP ^ HEP   Table pushups HEP ^ HEP           Home Exercises Given: see above ex 4x per week     Assessment: Pt's ROM is tight 130 AROM, 135 PROM this date at end of session. Did not progress much this date with activities d/t pain and soreness in joint.  Pt requiring minimal cues today to avoid valgus with exercises, use of mirror for feedback.       Treatment/Activity Tolerance:  [x] Patient tolerated treatment well [] Patient limited by fatigue  [] Patient limited by pain  [] Patient limited by other medical complications  [] Other:     Plan: Follow attached protocol    Time In/Time Out:   6497-1255             Timed Code/Total Treatment Minutes:   1 N, 1 TA, 1 TE    Electronically signed by:     Lori Gonsalez, DIANE [id number]

## 2021-05-13 ENCOUNTER — HOSPITAL ENCOUNTER (OUTPATIENT)
Dept: PHYSICAL THERAPY | Age: 21
Setting detail: THERAPIES SERIES
Discharge: HOME | End: 2021-05-13
Payer: COMMERCIAL

## 2021-05-13 PROCEDURE — 97530 THERAPEUTIC ACTIVITIES: CPT

## 2021-05-13 PROCEDURE — 97112 NEUROMUSCULAR REEDUCATION: CPT

## 2021-05-13 PROCEDURE — 97110 THERAPEUTIC EXERCISES: CPT

## 2021-05-13 NOTE — FLOWSHEET NOTE
d/t pain and soreness in joint. Pt requiring minimal cues today to avoid valgus with exercises, use of mirror for feedback.       Treatment/Activity Tolerance:  [x] Patient tolerated treatment well [] Patient limited by fatigue  [] Patient limited by pain  [] Patient limited by other medical complications  [] Other:     Plan: strength test next visit    Time In/Time Out:   1368-6390           Timed Code/Total Treatment Minutes:   1 N, 1 TA, 1 TE    Electronically signed by:

## 2021-05-17 ENCOUNTER — HOSPITAL ENCOUNTER (OUTPATIENT)
Dept: PHYSICAL THERAPY | Age: 21
Setting detail: THERAPIES SERIES
Discharge: HOME | End: 2021-05-17
Payer: COMMERCIAL

## 2021-05-17 PROCEDURE — 97530 THERAPEUTIC ACTIVITIES: CPT

## 2021-05-17 NOTE — PROGRESS NOTES
[facility]) at [facility]   [address] [address]  Fax [phone]     Arabella Strickland PT,ATC Phone: [phone]                                              To:  Dr Hugo Alejandra          From: Neela Hanley, PT, PT ATC      Patient: Boris Simmons                    : 2000    Diagnosis: L ACL revision BPTB + Med Men Repair + ALL autograft 21    Date: 2021         PT REASSESSMENT                   Evaluation Date: 03-10-21         Total Visits to date: 19      Outcome Measure:  IKDC                    Initial Score:    Discharge Score:             Sport:  Basketball Vinny point guard  Patient Goal:  Pt wants to rehab as though she will play next season, though she isn't sure if she will play. Patient History/Mechanism of Injury: Pt tore R ACL vinny year in [facility] 2016. Had ACL ham graft , was released at 6 months. Came to [place] 2018 with no issues. Tore L ACL in 2019, had hamstring graft, did well in rehab, released to basketball in 2020. On  pt was at basketball practice and had contact with another player and was hit on the medial side of L knee. Pt felt two pops and pain. Surgery 21. Symptoms:     03-10-21   05-17-21   Pain two days after surgery was 6/10, but has slowly decreased since then. Today 2/10 at knee both medial and lateral to patellar tendon. Pain worse at night.  Mild soreness 2/10   Aggrevators:  WB              Relievers: rest      Objective/Significant Findings + Goals     03-10-21 05-17-21 Goals TBA prior to DC to [facility] op Week   -12wks postop    Outcome IKDC  56.3%.  >90% indicating psychological readiness to compete   Brace locked Not yet fitted for functional knee brace Fittend for functional brace   Pain 3-7/10 Sore 2/10 No pain   Sleeping Waking 1-2 times per night Sleeping all night  MET   Gait 2 crutches TTWB locked brace Normal walking gait, jogging gait not assessed Normal IN PT.  WILL DISCUS PROGRESSION WITH MD.  REMINDERS FOR PATIENT TO GO SLOW ALLOWING MAX HEALING. Education on: Pt understands precautions. No running until cleared by MD.  No basketball activities    Plan: Will see 2X per week for 4 weeks then re-assess. Follow attached protocol. Begin walk jog progression per MD approval.    [x] Therapeutic Exercise  [x] Modalities:  [] Ultrasound [] Electrical Stimulation [] Cervical Traction   [x] Therapeutic Activity        [x] Gait Training        [x] Neuromuscular Re-education   [x] Instruction in HEP        [] Manual Therapy        [] Aquatic Therapy                               [] Vasopneumatic   [x] Other    Physical Therapy Certification/Re-Certification Form    Dr Tanisha Hernandez   The following patient has been evaluated for physical therapy services and for therapy to continue, insurance requires physician review of the treatment plan initially and every 90 days. Please review the attached evaluation and/or summary of the patient's plan of care, and verify that you agree therapy should continue by signing the attached document and sending it back to our office. Patient agrees with established plan of care and assisted in the development of their short term and long term goals. Patient had no adverse reaction with initial treatment and there are no barriers to learning. Demonstrates no mental or cognitive disorder. 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 If we are requesting more visits, we fully anticipate the patient's condition is expected to improve within the treatment timeframe we are requesting. Patient Status:      ___X__Re-Assessment, recommend additional visits     _____Hold therapy for MD visit     _____Discharge      Electronically signed by:  Laurita Armenta PT, PT, 5/17/2021, 1:11 PM    If you have any questions or concerns, please don't hesitate to call.  Thank you for your

## 2021-05-17 NOTE — FLOWSHEET NOTE
[facility]) Physical Therapy at [address], [place], [address]  Phone: [phone]   Fax:[phone]         Physical Therapy Treatment Note  Date:  2021    Patient Name:  Susan Phoenix      :  2000    MRN: [id number]    Diagnosis: L ACL revision BPTB + Med Men Repair + ALL autograft 21                  Surgeon: Julia Sanchez  Next Doctor Visit:       Insurance/Certification information:       Plan of care signed (Y/N): Pending  Changes to ambulatory summary sheet? No    Summary of history from Evaluation:  Sport:  Basketball Vinny point guard  Patient Goal:  Pt wants to rehab as though she will play next season, though she isn't sure if she will play. Patient History/Mechanism of Injury: Pt tore R ACL vinny year in [facility] 2016. Had ACL ham graft , was released at 6 months. Came to [place] 2018 with no issues. Tore L ACL in 2019, had hamstring graft, did well in rehab, released to basketball in 2020. On  pt was at basketball practice and had contact with another player and was hit on the medial side of L knee. Pt felt two pops and pain. Surgery 21.     Subjective/Pain: Pt states knee buckled once last week after PT.   Sore stiff knee 2/10  SEE REASSESSMENT    Objective: SEE REASSESSMENT  Exercises:  21 L ACL+med men+all 21    Visit#16 Visit #17 Visit#18   ROM     =10wks = 10 weeks =11wks   Ext      Flex 135 after PT     WarmUp      Elliptical  5min 5 min    Bike  5min 5 min    Shuttle Air hops 0Cx30 0C x 30 1C x30   Agility If no pain do 2v11dhi with rot next rx No rotation 2 x 60 seconds + rotation  [id number]   DL horizontal bunny hop Next week Next week X15, cues to land forward  R=31   Happy feet  Next rx Next rx    Exercises      Fwd Step Up 16in+airex dribblex4 x15 16in + airex dribble 4 x 15 L on step=34     Lateral Step down 8in with band at lateral knee x15 8 in x 15 8in 30sec  L=21  R=27   Hamstring ball holds  +cane pushup x20 + cane push up x 20 andrea 60 sec to cane  L stance=34  R stance=37   Halo x20 andrea X 20 andrea x20   Bosu squat holds [id number] pass 2 x 60 sec with vb pass    SL RDL Black bosu + dribble x20 Black bosu with dribble x 20 andrea 13.2# x15 bilateral   Lunges  +3in x30  Go to floor next rx X 30 andrea to floor pt unable  Walking with 5#db x10 ea side   Post reach Push step bkwd NT NT   Side stepping  x30 black TB NT x30 black TB   SL chair sit +6in 2x15 +6in 2 x 15 SL +3in 2x15   Lateral lunge x15 +3in with toss, go to chair next rx X 15 + 3in with toss to chair x15 to chair +3in x15 bilateral   FITNESS CENTER       Calf Raises SL1x30 SL x 30 SL in 30sec R=27 L=28   4way hip abduction 55#x15 55# x 15 55#   4way hip extension + ham 55#x15 55# x 15    Prone Hamstring Curl    20# DL up, SL down x10   SL Press 15#x10 watch for compensatory valgus 15 x 10  L=20# x15  R=60# x15   TABLE      SLR 3#x30 3# x 30 4#   Side hip abduction at wall  3#x30 3# x 30    LAQ 3#x30 3# x 30    Clam Shell  Black x30 NT          HEP      Ball Plank HEP HEP    Barton with L leg ext ^ HEP    Biceps in chair ^ HEP    Triceps in chair ^ HEP    Table pushups ^ HEP            Home Exercises Given: see above ex 4x per week     Assessment: SEE REASSESSMENT  Treatment/Activity Tolerance:  [x] Patient tolerated treatment well [] Patient limited by fatigue  [] Patient limited by pain  [] Patient limited by other medical complications  [] Other:     Plan: CONT PER ORDERS     Time In/Time Out:   9434-7863        Timed Code/Total Treatment Minutes:   3TA    Electronically signed by:

## 2021-05-20 ENCOUNTER — HOSPITAL ENCOUNTER (OUTPATIENT)
Dept: PHYSICAL THERAPY | Age: 21
Setting detail: THERAPIES SERIES
Discharge: HOME | End: 2021-05-20
Payer: COMMERCIAL

## 2021-05-20 PROCEDURE — 97530 THERAPEUTIC ACTIVITIES: CPT

## 2021-05-20 NOTE — FLOWSHEET NOTE
+ ham NT   Prone Hamstring Curl  L=20# DL up  R=30#x10   SL Press 70#x10   TABLE    SLR NT   Side hip abduction at wall  Black tB x30   LAQ 5#x30   Clam Shell         HEP    [employer] Crunch with L leg ext HEP   Biceps in chair HEP   Triceps in chair HEP   Table pushups HEP         Home Exercises Given: see above ex 4x per week     Assessment:  said ok to begin walk jog progression    Treatment/Activity Tolerance:  [x] Patient tolerated treatment well [] Patient limited by fatigue  [] Patient limited by pain  [] Patient limited by other medical complications  [] Other:     Plan: CONT PER ORDERS     Time In/Time Out:   7432-0138      Timed Code/Total Treatment Minutes:   3TA    Electronically signed by:

## 2021-06-02 ENCOUNTER — HOSPITAL ENCOUNTER (OUTPATIENT)
Dept: PHYSICAL THERAPY | Age: 21
Setting detail: THERAPIES SERIES
Discharge: HOME | End: 2021-06-02
Payer: COMMERCIAL

## 2021-06-02 PROCEDURE — 97110 THERAPEUTIC EXERCISES: CPT

## 2021-06-02 PROCEDURE — 97112 NEUROMUSCULAR REEDUCATION: CPT

## 2021-06-02 NOTE — FLOWSHEET NOTE
[facility]) Physical Therapy at [address], [place], [address]   Phone: [phone]   Fax:[phone]         Physical Therapy Treatment Note  Date:  2021    Patient Name:  Razia Moses      :  2000    MRN: [id number]    Diagnosis: L ACL revision BPTB + Med Men Repair + ALL autograft 21                  Surgeon: Imer Diego  Next Doctor Visit:       Insurance/Certification information:       Plan of care signed (Y/N): Pending  Changes to ambulatory summary sheet? No    Summary of history from Evaluation:  Sport:  Basketball Vinny point guard  Patient Goal:  Pt wants to rehab as though she will play next season, though she isn't sure if she will play. Patient History/Mechanism of Injury: Pt tore R ACL vinny year in [facility] 2016. Had ACL ham graft , was released at 6 months. Came to [place] 2018 with no issues. Tore L ACL in 2019, had hamstring graft, did well in rehab, released to basketball in 2020. On  pt was at basketball practice and had contact with another player and was hit on the medial side of L knee. Pt felt two pops and pain. Surgery 21.     Subjective/Pain: Pt saw dr. Pamela Green to begin jog. Was home last week  No shooting, will decide in Aug about bridge     Objective: looks great today  Exercises:  21 L ACL+med men+all 21    Visit#19 Visit#20   ROM     =12 wks 6=14wks   Gym running  Court lengt x8 steemer 50 yds        WarmUp     Elliptical  5min    Bike  5min    TM . 1walk/1.jog x2 advance to 3 next session, then 4 then 5 by one  week from today TM jogging with altered gait so went to steemer, jogging with altered rere. Mild post knee pain with jogging   [place]     Gait training on track  [place] attempting to normalize rere   Agility [id number]   DL horizontal bunny hop DL=72in  SL L=51in R=71in DLx10 landing R  SL=rigid   Happy feet  6in step 60sec  L=55   R=?  NT   50yd stool pull on track  L=55.85 R  35.55   Lateral Walking +gallop Black tB x30 ea way x30 on turf   Lateral Step down 8in step down + RDL x15 bilateral 8in x30   Roller Hamstrings  DL 2x15 Stool pull    Lunge PNF + TB x20 ea side NT   SL chair sit 21in chair 2x15 x20   Walking lunges   On turf x20 bilateral   Walking RDL on turf  x15 bilateral    Lateral lunge To chair x15 andrea x15 +MB on turf   FITNESS CENTER      Calf Raises SL x30 SL x30   4way hip abduction  55#   4way hip extension + ham NT 55#   Prone Hamstring Curl  L=20# DL up  R=30#x10 L=20#  R=30#   SL Press 70#x10 70#   TABLE     Hamstring stretches  NT    LAQ 5#x30 10#x30     Home Exercises Given: see above ex 4x per week     Assessment:  said ok to begin walk jog progression    Treatment/Activity Tolerance:  [x] Patient tolerated treatment well [] Patient limited by fatigue  [] Patient limited by pain  [] Patient limited by other medical complications  [] Other:     Plan: CONT PER ORDERS     Time In/Time Out:   1545-2640      Timed Code/Total Treatment Minutes:   2TE 1N    Electronically signed by:

## 2021-06-07 ENCOUNTER — HOSPITAL ENCOUNTER (OUTPATIENT)
Dept: PHYSICAL THERAPY | Age: 21
Setting detail: THERAPIES SERIES
Discharge: HOME | End: 2021-06-07
Payer: COMMERCIAL

## 2021-06-07 PROCEDURE — 97112 NEUROMUSCULAR REEDUCATION: CPT

## 2021-06-07 PROCEDURE — 97110 THERAPEUTIC EXERCISES: CPT

## 2021-06-07 NOTE — FLOWSHEET NOTE
[facility]) Physical Therapy at [address] [place], [address]  Phone: [phone]   Fax:[phone]         Physical Therapy Treatment Note  Date:  2021    Patient Name:  Yuni Blackwell      :  2000    MRN: [id number]    Diagnosis: L ACL revision BPTB + Med Men Repair + ALL autograft 21                  Surgeon: Yohannes Peralta  Next Doctor Visit:       Insurance/Certification information:       Plan of care signed (Y/N): Pending  Changes to ambulatory summary sheet? No    Summary of history from Evaluation:  Sport:  Basketball Vinny point guard  Patient Goal:  Pt wants to rehab as though she will play next season, though she isn't sure if she will play. Patient History/Mechanism of Injury: Pt tore R ACL vinny year in [facility] 2016. Had ACL ham graft , was released at 6 months. Came to [place] 2018 with no issues. Tore L ACL in 2019, had hamstring graft, did well in rehab, released to basketball in 2020. On  pt was at basketball practice and had contact with another player and was hit on the medial side of L knee. Pt felt two pops and pain.   Surgery 21.     Subjective/Pain: Pt sore post knee 3/10  No shooting, will decide in Aug about bridge     Objective: looks great today  Exercises:  21 L ACL+med men+all 21    Visit#21    1 mile bike   [place]   6/4=14wks   Gym running  50yds x4   DL hops x10 + catch   Agility NT   Burpies x15 on track   Lateral shuffle  [id number]   Lateral walking +TB +dribble x30 each way    Walking RDL on turf x15 bilateral   Walking lunges on turf 60yds 2:00   Track stool pull 60yds L=62.85  R=50.75   Lateral Lunge    SL sit to high jump mat x15 each side   Running program HEP Jog a mile   Lateral Step down NT   Roller Hamstrings  NT   Lunge PNF + TB NT   FITNESS CENTER     Calf Raises SLx30   4way hip abduction 50#x15 in wgt room   4way hip extension + ham 50# x15 in wgt room   Prone Hamstring Helen     SL Press    TABLE    Hamstring stretches     LAQ      Home Exercises Given: see above ex 4x per week     Assessment:  said ok to begin walk jog progression    Treatment/Activity Tolerance:  [x] Patient tolerated treatment well [] Patient limited by fatigue  [] Patient limited by pain  [] Patient limited by other medical complications  [] Other:     Plan: CONT PER ORDERS     Time In/Time Out:   111-1200      Timed Code/Total Treatment Minutes:   2TE 1N    Electronically signed by:

## 2021-06-10 ENCOUNTER — HOSPITAL ENCOUNTER (OUTPATIENT)
Dept: PHYSICAL THERAPY | Age: 21
Setting detail: THERAPIES SERIES
Discharge: HOME | End: 2021-06-10
Payer: COMMERCIAL

## 2021-06-10 PROCEDURE — 97112 NEUROMUSCULAR REEDUCATION: CPT

## 2021-06-10 PROCEDURE — 97110 THERAPEUTIC EXERCISES: CPT

## 2021-06-10 NOTE — FLOWSHEET NOTE
[facility]) Physical Therapy at [address] [place], [address]  Phone: [phone]   Fax:[phone]         Physical Therapy Treatment Note  Date:  6/10/2021    Patient Name:  Blaise Bennett      :  2000    MRN: [id number]    Diagnosis: L ACL revision BPTB + Med Men Repair + ALL autograft 21                  Surgeon: Andreea Rangel  Next Doctor Visit:       Insurance/Certification information:       Plan of care signed (Y/N): Pending  Changes to ambulatory summary sheet? No    Summary of history from Evaluation:  Sport:  Basketball Vinny point guard  Patient Goal:  Pt wants to rehab as though she will play next season, though she isn't sure if she will play. Patient History/Mechanism of Injury: Pt tore R ACL vinny year in [facility] . Had ACL ham graft , was released at 6 months. Came to [place] 2018 with no issues. Tore L ACL in 2019, had hamstring graft, did well in rehab, released to basketball in 2020. On  pt was at basketball practice and had contact with another player and was hit on the medial side of L knee. Pt felt two pops and pain. Surgery 21.     Subjective/Pain: Pt sore post knee 3/10. Ankit Records  sore after last visit  No shooting, will decide in Aug about bridge     Objective: limit plyo today due to post knee pain  Exercises:  21 L ACL+med men+all 06-07-21 06-10-21    Visit#21 Visit#22    1 mile bike 1mile bike   [place]   =14wks =15wks   Gym running  50yds x4 NT   DL hops x10 + catch NT   Agility NT bosu shooting 10 min   Burpies x15 on track    Lateral shuffle  [id number] x60sec   Lateral walking +TB +dribble x30 each way  x30 each way    Walking RDL on turf x15 bilateral On court   Walking lunges on turf 60yds 2:00 On court    Track stool pull 60yds L=62.85  R=50.75 NT   Lateral Lunge  x15 with bball catch    SL sit to high jump mat x15 each side NT   Running program HEP Jog a mile Rotation agility x60sec will run .1walk/.1jog x4 sets on Sat   Lateral Step down NT 8-9in x30   Roller Hamstrings  NT 2x15 D:L   Lunge PNF + TB NT    FITNESS CENTER      Calf Raises SLx30 SL x30   4way hip abduction 50#x15 in wgt room    4way hip extension + ham 50# x15 in wgt room    SL Quad Extension  L=20# 90-40 x10   Prone Hamstring Curl   L=20#x10   SL Press  L=30#x10  R=70#   TABLE     Hamstring stretches   GR   LAQ       Home Exercises Given: see above ex 4x per week     Assessment:  said ok to begin walk jog progression    Treatment/Activity Tolerance:  [x] Patient tolerated treatment well [] Patient limited by fatigue  [] Patient limited by pain  [] Patient limited by other medical complications  [] Other:     Plan: CONT PER ORDERS.  Hold jumping and running     Time In/Time Out:   6472-7155     Timed Code/Total Treatment Minutes:   2TE 1N    Electronically signed by:

## 2021-06-14 ENCOUNTER — HOSPITAL ENCOUNTER (OUTPATIENT)
Dept: PHYSICAL THERAPY | Age: 21
Setting detail: THERAPIES SERIES
Discharge: HOME | End: 2021-06-14
Payer: COMMERCIAL

## 2021-06-14 PROCEDURE — 97112 NEUROMUSCULAR REEDUCATION: CPT

## 2021-06-14 PROCEDURE — 97110 THERAPEUTIC EXERCISES: CPT

## 2021-06-14 NOTE — FLOWSHEET NOTE
[facility]) Physical Therapy at [address] [place], [address]  Phone: [phone]   Fax:[phone]         Physical Therapy Treatment Note  Date:  2021    Patient Name:  Boogie Salomon      :  2000    MRN: [id number]    Diagnosis: L ACL revision BPTB + Med Men Repair + ALL autograft 21                  Surgeon: Catina Quick  Next Doctor Visit:       Insurance/Certification information:       Plan of care signed (Y/N): Pending  Changes to ambulatory summary sheet? No    Summary of history from Evaluation:  Sport:  Basketball Vinny point guard  Patient Goal:  Pt wants to rehab as though she will play next season, though she isn't sure if she will play. Patient History/Mechanism of Injury: Pt tore R ACL vinny year in [facility] 2016. Had ACL ham graft , was released at 6 months. Came to [place] 2018 with no issues. Tore L ACL in 2019, had hamstring graft, did well in rehab, released to basketball in 2020. On  pt was at basketball practice and had contact with another player and was hit on the medial side of L knee. Pt felt two pops and pain.   Surgery 21.     Subjective/Pain: more knee pain post 4/10  No shooting, will decide in Aug about bridge     Objective: limit plyo today due to post knee pain  Exercises:  21 L ACL+med men+all 06-10-21 06    Visit#22 Visit#23    1mile bike elliptical   Steemer   =15wks =15wks   Gym running  NT NT due to pain   DL hops NT x10   Agility bosu shooting 10 min bosu shooting 10 min    Burpies  x15 in clinic   Lateral shuffle  x60sec NT   Lateral walking +TB +dribble x30 each way  Lateral walking    Walking RDL on turf On court On court   Walking lunges on turf 60yds On court  stationay to airex +5#db x15 then pain   Track stool pull 60yds NT NT   Lateral Lunge x15 with bball catch  painful   SL sit to high jump mat NT Chair +airex x30   Running program HEP Rotation agility x60sec will run . 1walk/.1jog x4 sets on Sat Hold due to pain in post knee    Lateral Step down 8-9in x30 NT   Roller Hamstrings  2x15 D:L DL 2x15   F[facility]NESS CENTER      Calf Raises SL x30 SL x30   4way hip abduction 50#x15 5# wall abduction   4way hip extension + ham 50#x15 wgt room  SLR x30   SL Quad Extension L=20# 90-40 x10 NT   Prone Hamstring Curl  L=20#x10 L=   SL Press L=30#x10  R=70# L=30#  R=70#   TABLE     Hamstring stretches  GR GR   LAQ       Home Exercises Given: see above ex 4x per week     Assessment: take it easy this week due to post knee pain    Treatment/Activity Tolerance:  [x] Patient tolerated treatment well [] Patient limited by fatigue  [] Patient limited by pain  [] Patient limited by other medical complications  [] Other:     Plan: CONT PER ORDERS.  Hold jumping and running     Time In/Time Out:   1000-11    Timed Code/Total Treatment Minutes:   2TE 1N    Electronically signed by:

## 2021-06-21 ENCOUNTER — HOSPITAL ENCOUNTER (OUTPATIENT)
Dept: PHYSICAL THERAPY | Age: 21
Setting detail: THERAPIES SERIES
Discharge: HOME | End: 2021-06-21
Payer: COMMERCIAL

## 2021-06-21 PROCEDURE — 97112 NEUROMUSCULAR REEDUCATION: CPT

## 2021-06-21 PROCEDURE — 97110 THERAPEUTIC EXERCISES: CPT

## 2021-06-21 NOTE — FLOWSHEET NOTE
[facility]) Physical Therapy at [address], [address], [address]  Phone: [phone]   Fax:[phone]         Physical Therapy Treatment Note  Date:  2021    Patient Name:  Bal Bunch      :  2000    MRN: [id number]    Diagnosis: L ACL revision BPTB + Med Men Repair + ALL autograft 21                  Surgeon: Salomon Upton  Next Doctor Visit:       Insurance/Certification information:       Plan of care signed (Y/N): Pending  Changes to ambulatory summary sheet? 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 Patient History/Mechanism of Injury: Pt tore R ACL hipolito year in [facility] 2016. Had ACL ham graft , was released at 6 months. Came to [place] 2018 with no issues. Tore L ACL in 2019, had hamstring graft, did well in rehab, released to basketball in 2020. On  pt was at basketball practice and had contact with another player and was hit on the medial side of L knee. Pt felt two pops and pain.   Surgery 21.     Subjective/Pain: more knee pain post 4/10  No shooting, will decide in Aug about bridge     Objective: limit plyo today due to post knee pain  Exercises:  21 L ACL+med men+all 06-10-21 06-14 06-21-21    Visit#22 Visit#23 Visit#24    1mile bike elliptical elliptical   Steemer   =15wks =15wks =16wks   Gym dynamics   Power skips  [employer] kicks  Box drill x3   Gym running  NT NT due to pain x4 at 60, 70, 80%   DL hops NT x10 DL +catch and step react   Agility bosu shooting 10 min bosu shooting 10 min  SL hops  SL half court hop   Burpies  x15 in clinic NT   Lateral shuffle  x60sec NT NT   Lateral walking +TB +dribble x30 each way  Lateral walking  x30 n gym + dribble   Walking RDL on turf On court On court x20 +MB   Walking lunges on turf 60yds On court stationay to airex +5#db x15 then pain NT   Track stool pull 60yds NT NT L=57   R=45   Lateral Lunge x15 with bball catch  painful NT   SL sit to high jump mat NT Chair +airex x30 NT   Running program HEP Rotation agility x60sec will run . 1walk/.1jog x4 sets on Sat Hold due to pain in post knee  Hold until thur   Lateral Step down 8-9in x30 NT NT   Roller Hamstrings  2x15 D:L DL 2x15 DL 2x15   FITNESS CENTER       Calf Raises SL x30 SL x30 SL x30   4way hip abduction 50#x15 5# wall abduction 70#x15   4way hip extension + ham 50#x15 wgt room  SLR x30 70#x15   SL Quad Extension L=20# 90-40 x10 NT NT   Prone Hamstring Curl  L=20#x10 L= L=20# 1x10   SL Press L=30#x10  R=70# L=30#  R=70# L=50#  R=70#   TABLE      Hamstring stretches  GR GR    LAQ        Home Exercises Given: see above ex 4x per week     Assessment: take it easy this week due to post knee pain    Treatment/Activity Tolerance:  [x] Patient tolerated treatment well [] Patient limited by fatigue  [] Patient limited by pain  [] Patient limited by other medical complications  [] Other:     Plan: CONT PER ORDERS.  Hold jumping and running     Time In/Time Out:   1000-11    Timed Code/Total Treatment Minutes:   2TE 1N    Electronically signed by:

## 2021-06-24 ENCOUNTER — HOSPITAL ENCOUNTER (OUTPATIENT)
Dept: PHYSICAL THERAPY | Age: 21
Setting detail: THERAPIES SERIES
Discharge: HOME | End: 2021-06-24
Payer: COMMERCIAL

## 2021-06-24 PROCEDURE — 97110 THERAPEUTIC EXERCISES: CPT

## 2021-06-24 PROCEDURE — 97112 NEUROMUSCULAR REEDUCATION: CPT

## 2021-06-24 NOTE — FLOWSHEET NOTE
[facility]) Physical Therapy at [address] [place], [address]  Phone: [phone]   Fax:[phone]         Physical Therapy Treatment Note  Date:  2021    Patient Name:  Rio Neil      :  2000    MRN: [id number]    Diagnosis: L ACL revision BPTB + Med Men Repair + ALL autograft 21                  Surgeon: Jose Vicente  Next Doctor Visit:      Insurance/Certification information:       Plan of care signed (Y/N): Pending  Changes to ambulatory summary sheet? No    Summary of history from Evaluation:  Sport:  Basketball Vinny point guard  Patient Goal:  Pt wants to rehab as though she will play next season, though she isn't sure if she will play. Patient History/Mechanism of Injury: Pt tore R ACL vinny year in [facility] 2016. Had ACL ham graft , was released at 6 months. Came to [place] 2018 with no issues. Tore L ACL in 2019, had hamstring graft, did well in rehab, released to basketball in 2020. On  pt was at basketball practice and had contact with another player and was hit on the medial side of L knee. Pt felt two pops and pain. Surgery 21.     Subjective/Pain: more knee pain post 2/10 after workout. MIld patellar tendon pain with extension /10  No shooting, will decide in Aug about bridge     Objective: limit plyo today due to post knee pain  Exercises:  21 L ACL+med men+all 21    Visit#25    elliptical   Steemer   =17wks   Gym dynamics Power skips  [employer]aya kicks  Puddle jumps  Box drill x3   Burpies x15   Gym running     DL hops DL +catch and step react   SL hops SL hopsx10  20yd hop x2 half court   SL quarter rot hops x5med x5lat   Lateral shuffle  [id number]   Lateral walking +TB +dribble NT   Walking RDL on turf On blue bosu x15   Walking lunges on turf 60yds On blue bosu x15   Track stool pull 60yds NT   Lateral Lunge x15   Running program HEP 1 mile . 1walk 1.jog   Lateral Step down NT Roller Hamstrings  SL x15 andrea, left needed 3 rest breaks   F[facility]NESS CENTER     Calf Raises SLx30   4way hip abduction 70#x15   4way hip extension + ham 70#x15   SL Quad Extension L=40#x10 50#x10   R=70# 2x10   Prone Hamstring Curl  L=20# x10  R=30#x10   SL Press L=50# R=70# x10   TABLE    Hamstring stretches  2x60   LAQ      Home Exercises Given: see above ex 4x per week     Assessment: take it easy this week due to post knee pain    Treatment/Activity Tolerance:  [x] Patient tolerated treatment well [] Patient limited by fatigue  [] Patient limited by pain  [] Patient limited by other medical complications  [] Other:     Plan: CONT PER ORDERS.     Time In/Time Out:   4931-2863    Timed Code/Total Treatment Minutes:   2TE 1N    Electronically signed by:

## 2021-07-01 ENCOUNTER — HOSPITAL ENCOUNTER (OUTPATIENT)
Dept: PHYSICAL THERAPY | Age: 21
Setting detail: THERAPIES SERIES
Discharge: HOME | End: 2021-07-01
Payer: COMMERCIAL

## 2021-07-01 PROCEDURE — 97112 NEUROMUSCULAR REEDUCATION: CPT

## 2021-07-01 PROCEDURE — 97110 THERAPEUTIC EXERCISES: CPT

## 2021-07-01 NOTE — FLOWSHEET NOTE
[address]  Physical Therapy at [address], [place], [address]  Phone: [phone]   Fax:[phone]         Physical Therapy Treatment Note  Date:  2021    Patient Name:  Berry Cruz      :  2000    MRN: [id number]    Diagnosis: L ACL revision BPTB + Med Men Repair + ALL autograft 21                  Surgeon: Shani Hernandez  Next Doctor Visit:      Insurance/Certification information:       Plan of care signed (Y/N): Pending  Changes to ambulatory summary sheet? No    Summary of history from Evaluation:  Sport:  Basketball Vinny point guard  Patient Goal:  Pt wants to rehab as though she will play next season, though she isn't sure if she will play. Patient History/Mechanism of Injury: Pt tore R ACL vinny year in [facility] 2016. Had ACL ham graft , was released at 6 months. Came to [place] 2018 with no issues. Tore L ACL in 2019, had hamstring graft, did well in rehab, released to basketball in 2020. On  pt was at basketball practice and had contact with another player and was hit on the medial side of L knee. Pt felt two pops and pain. Surgery 21.     Subjective/Pain: ran 1walk/.1jog for one mile.  No post knee pain  Objective:   Exercises:  21 L ACL+med men+all 21    Visit#25 Visit#26    elliptical bike   Steemer   =17wks    Gym dynamics Power skips  Avaya kicks  Puddle jumps  Box drill x3 Power skips  [employer] kicks  Puddle jumps  Box drill x3   Burpies x15 x15   Gym running   x6   DL hops DL +catch and step react x6   SL hops SL hopsx10  20yd hop x2 half court SL hopx5  SL double hop x5  Sl hop to half court x2   SL quarter rot hops x5med x5lat x5med x5 lateral   Lateral shuffle  [id number]   Lateral walking +TB +dribble NT NT   Walking RDL on turf On blue bosu x15 12# db x20 andrea   Walking lunges on turf 60yds On blue bosu x15 On blue bosu x15 andrea   Track stool pull 60yds NT

## 2021-07-05 ENCOUNTER — HOSPITAL ENCOUNTER (OUTPATIENT)
Dept: PHYSICAL THERAPY | Age: 21
Setting detail: THERAPIES SERIES
Discharge: HOME | End: 2021-07-05
Payer: COMMERCIAL

## 2021-07-05 PROCEDURE — 97110 THERAPEUTIC EXERCISES: CPT

## 2021-07-05 PROCEDURE — 97112 NEUROMUSCULAR REEDUCATION: CPT

## 2021-07-05 NOTE — FLOWSHEET NOTE
[facility]) Physical Therapy at [address] [place], [address]  Phone: [phone]   Fax:[phone]         Physical Therapy Treatment Note  Date:  2021    Patient Name:  Abbi Duvall      :  2000    MRN: [id number]    Diagnosis: L ACL revision BPTB + Med Men Repair + ALL autograft 21                  Surgeon: Lucendia Severin  Next Doctor Visit:      Insurance/Certification information:       Plan of care signed (Y/N): Pending  Changes to ambulatory summary sheet? No    Summary of history from Evaluation:  Sport:  Basketball Vinny point guard  Patient Goal:  Pt wants to rehab as though she will play next season, though she isn't sure if she will play. Patient History/Mechanism of Injury: Pt tore R ACL vinny year in [facility] 2016. Had ACL ham graft , was released at 6 months. Came to [facility] 2018 with no issues. Tore L ACL in 2019, had hamstring graft, did well in rehab, released to basketball in 2020. On  pt was at basketball practice and had contact with another player and was hit on the medial side of L knee. Pt felt two pops and pain. Surgery 21.     Subjective/Pain: no pain since last visit. She didn't get a chance to run last weekend due to being out of town for   Objective: still lands with more force on R leg vs L with running.   Mild R valgus noted on bosu  Exercises:  21 L ACL+med men+all 21    Visit#25 Visit#26 #27    elliptical bike bike   Steemer   =17wks  =18wks   Gym dynamics Power skips  Avaya kicks  Puddle jumps  Box drill x3 Power skips  Avaya kicks  Puddle jumps  Box drill x3 Ham kicks  Mirant  Power jumps  Puddle jumps  Braiding  Box drill x3  [id number]   Burpies x15 x15 x15   Gym running   x6 x6   DL hops DL +catch and step react x6 Jog double hop x5   SL hops SL hopsx10  20yd hop x2 half court SL hopx5  SL double hop x5  Sl hop to half court x2 SL x5  Tripe crossover x5   SL quarter rot hops x5med x5lat x5med x5 lateral x5med x5lat   Lateral shuffle  [id number]   Lateral walking +TB +dribble NT NT NT   Walking RDL on turf On blue bosu x15 12# db x20 andrea With basektball courtx2   Walking lunges on turf 60yds On blue bosu x15 On blue bosu x15 andrea 1 court    Track stool pull 60yds NT NT NT   Lateral Lunge x15 NT NT   Core: halo + ball plank   Halo x20 andrea  Plank [id number]   Running program HEP 1 mile . 1walk 1.jog Walk a lap, jog a lap total=1 mile Didn't have time to run last weekend, will run tmrw   SL chair sit  x30 NT   Lateral Step down NT NT 8inx30   Roller Hamstrings  SL x15 andrea, left needed 3 rest breaks SL Rx15 Lx10x5 SL 2x10   FITNESS CENTER       Calf Raises SLx30 SLx30 SL x30   4way hip abduction 70#x15 70#x15 70# x15   4way hip extension + ham 70#x15 70#x15 70# x15   SL Quad Extension L=40#x10 50#x10   R=70# 2x10 L=50# R=70# 2x10 NT   Prone Hamstring Curl  L=20# x10  R=30#x10 20# 2x10 20#2x10   SL Press L=50# R=70# x10 L=50# R=70# 2x10 L=50#  R=70# 2x10   TABLE      Hamstring stretches  2x60  2s69ynf     Home Exercises Given: see above ex 4x per week     Assessment: improving each visit as we advance thoughtful plyo + dynamics    Treatment/Activity Tolerance:  [x] Patient tolerated treatment well [] Patient limited by fatigue  [] Patient limited by pain  [] Patient limited by other medical complications  [] Other:     Plan: CONT PER ORDERS.     Time In/Time Out:   4144-9562    Timed Code/Total Treatment Minutes:   2TE 1N    Electronically signed by:

## 2021-07-12 ENCOUNTER — HOSPITAL ENCOUNTER (OUTPATIENT)
Dept: PHYSICAL THERAPY | Age: 21
Setting detail: THERAPIES SERIES
Discharge: HOME | End: 2021-07-12
Payer: COMMERCIAL

## 2021-07-12 PROCEDURE — 97110 THERAPEUTIC EXERCISES: CPT

## 2021-07-12 PROCEDURE — 97112 NEUROMUSCULAR REEDUCATION: CPT

## 2021-07-12 NOTE — FLOWSHEET NOTE
[facility]) Physical Therapy at [address] [place], [address]  Phone: [phone]   Fax:[phone]         Physical Therapy Treatment Note  Date:  2021    Patient Name:  Kati Cherry      :  2000    MRN: [id number]    Diagnosis: L ACL revision BPTB + Med Men Repair + ALL autograft 21                  Surgeon: Payton Persaud  Next Doctor Visit:      Insurance/Certification information:       Plan of care signed (Y/N): Pending  Changes to ambulatory summary sheet? No    Summary of history from Evaluation:  Sport:  Basketball Vinny point guard  Patient Goal:  Pt wants to rehab as though she will play next season, though she isn't sure if she will play. Patient History/Mechanism of Injury: Pt tore R ACL vinny year in [facility] 2016. Had ACL ham graft , was released at 6 months. Came to [place] 2018 with no issues. Tore L ACL in 2019, had hamstring graft, did well in rehab, released to basketball in 2020. On  pt was at basketball practice and had contact with another player and was hit on the medial side of L knee. Pt felt two pops and pain. Surgery 21.     Subjective/Pain: no pain since last visit. Objective: still lands with more force on R leg vs L with running.   Mild R valgus noted on bosu  Exercises:  21 L ACL+med men+all 21    Visit#26 #27 #28    bike bike bike   [employer]    7/2=18wks 7/=19wks   Gym dynamics Power skips  [employer]aya kicks  Puddle jumps  Box drill x3 Ham kicks  Mirant  Power jumps  Puddle jumps  Braiding  Box drill x3  [id number] Ham kicks  Mirant  Power jumps  Puddle jumps  Braiding  Box drill x3  [id number]   Burpies x15 x15 x15   Gym running  x6 x6 x6   DL hops x6 Jog double hop x5 20yd SL hop half court x2   SL hops SL hopx5  SL double hop x5  Sl hop to half court x2 SL x5  Tripe crossover x5 NT   SL quarter rot hops x5med x5 lateral x5med x5lat x5 + return Med/Lat   Lateral shuffle  1n91tsp 7o82sdl 2x60   Pivot 45   x3   Lunge + shoot    x10 andrea   Lateral walking +TB +dribble NT NT NT   Walking RDL on turf 12# db x20 andrea With basektball courtx2 On bosu x15 andrea   Walking lunges on turf 60yds On blue bosu x15 andrea 1 court  lungeshoot as above   Track stool pull 60yds NT NT NT   Lateral Lunge NT NT NT   Core: halo + ball plank  Halo x20 andrea  Plank 7s66hki Halo x20 andrea  Plank [id number]   Running program HEP Walk a lap, jog a lap total=1 mile Didn't have time to run last weekend, will run tmrw Ran a mile in sub 9, will run 800<330 walk a lap, repeat   SL chair sit x30 NT One airex   Lateral Step down NT 8inx30 NT   Roller Hamstrings  SL Rx15 Lx10x5 SL 2x10 SL   FITNESS CENTER       Calf Raises SLx30 SL x30 SL x30   4way hip abduction 70#x15 70# x15 70# x15   4way hip extension + ham 70#x15 70# x15 70# x15   SL Quad Extension L=50# R=70# 2x10 NT L=50# R=70# 2x10   Prone Hamstring Curl  20# 2x10 20#2x10 20#2x10   SL Press L=50# R=70# 2x10 L=50#  R=70# 2x10 L=50#  R=70# 2x10   TABLE      Hamstring stretches   4s21lce [id number]   Home Exercises Given: see above ex 4x per week     Assessment: improving each visit as we advance thoughtful plyo + dynamics    Treatment/Activity Tolerance:  [x] Patient tolerated treatment well [] Patient limited by fatigue  [] Patient limited by pain  [] Patient limited by other medical complications  [] Other:     Plan: CONT PER ORDERS.  To see MD second week in Aug for probable DC to bridge     Time In/Time Out:   3026-7494     Timed Code/Total Treatment Minutes:   2TE 1N    Electronically signed by:

## 2021-07-19 ENCOUNTER — HOSPITAL ENCOUNTER (OUTPATIENT)
Dept: PHYSICAL THERAPY | Age: 21
Setting detail: THERAPIES SERIES
Discharge: HOME | End: 2021-07-19
Payer: COMMERCIAL

## 2021-07-19 PROCEDURE — 97110 THERAPEUTIC EXERCISES: CPT

## 2021-07-19 PROCEDURE — 97112 NEUROMUSCULAR REEDUCATION: CPT

## 2021-07-19 NOTE — FLOWSHEET NOTE
[facility]) Physical Therapy at [address] [place], [address]   Phone: [phone]   Fax:[phone]         Physical Therapy Treatment Note  Date:  2021    Patient Name:  Ruthy Parham      :  2000    MRN: [id number]    Diagnosis: L ACL revision BPTB + Med Men Repair + ALL autograft 21                  Surgeon: Yakov Jones  Next Doctor Visit:      Insurance/Certification information:       Plan of care signed (Y/N): Pending  Changes to ambulatory summary sheet? No    Summary of history from Evaluation:  Sport:  Basketball Vinny point guard  Patient Goal:  Pt wants to rehab as though she will play next season, though she isn't sure if she will play. Patient History/Mechanism of Injury: Pt tore R ACL vinny year in [facility] . Had ACL ham graft , was released at 6 months. Came to [place] 2018 with no issues. Tore L ACL in 2019, had hamstring graft, did well in rehab, released to basketball in 2020. On  pt was at basketball practice and had contact with another player and was hit on the medial side of L knee. Pt felt two pops and pain. Surgery 21.     Subjective/Pain: no pain since last visit. Objective: still lands with more force on R leg vs L with running.   Mild R valgus noted on bosu  Exercises:  21 L ACL+med men+all 07    #28 #29    bike    Steemer   =19wks =21wks   Gym dynamics Ham kicks  Mirant  Power jumps  Puddle jumps  Braiding  Box drill x3  [id number] Ham kicks  Mirant  Power jumps  Puddle jumps  Braiding  Box drill x3  [id number]   Burpies x15 x20   Gym running  x6 100yds x3   DL hops 20yd SL hop half court x2 20yd hopx2   SL hops NT tripple crossover x5   SL quarter rot hops x5 + return Med/Lat x5lat x5med   Lateral shuffle  2x60 NT   Pivot 45 x3 x3   Lunge + shoot  x10 andrea NT   Lateral walking +TB +dribble NT    RDL  On bosu x15 andrea Blue bosu chair reach +MB ball x15 bilateral   Walking lunges on turf 60yds lungeshoot as above bkwd rotational lunge x15 andrea   Track stool pull 60yds NT    Lateral Lunge NT x15   Core: halo + ball plank Halo x20 andrea  Plank [id number] Halo x20 andrea  Plank [id number]   Running program HEP Ran a mile in sub 9, will run 800<330 walk a lap, repeat Ran a mile in sub 9, will run 800<330 walk a lap, repeat   SL chair sit One airex +3in x30   Lateral Step down NT NT   Roller Hamstrings  SL SL   FITNESS CENTER      Calf Raises SL x30 SLx30   4way hip abduction 70# x15 70#x15   4way hip extension + ham 70# x15 70#x15   SL Quad Extension L=50# R=70# 2x10 NT   Prone Hamstring Curl  20#2x10 20# 2x10   SL Press L=50#  R=70# 2x10 L=50#  R=70# 2x10   TABLE     Hamstring stretches  2x06nka 2r99xdd   Home Exercises Given: see above ex 4x per week     Assessment: improving each visit as we advance thoughtful plyo + dynamics    Treatment/Activity Tolerance:  [x] Patient tolerated treatment well [] Patient limited by fatigue  [] Patient limited by pain  [] Patient limited by other medical complications  [] Other:     Plan: CONT PER ORDERS.  To see MD second week in Aug for probable DC to bridge     Time In/Time Out: 3657-6694    Timed Code/Total Treatment Minutes:   2TE 1N    Electronically signed by:

## 2021-08-04 ENCOUNTER — HOSPITAL ENCOUNTER (OUTPATIENT)
Dept: PHYSICAL THERAPY | Age: 21
Setting detail: THERAPIES SERIES
Discharge: HOME | End: 2021-08-04
Payer: COMMERCIAL

## 2021-08-04 PROCEDURE — 97110 THERAPEUTIC EXERCISES: CPT

## 2021-08-04 PROCEDURE — 97112 NEUROMUSCULAR REEDUCATION: CPT

## 2021-08-04 NOTE — FLOWSHEET NOTE
[facility]) Physical Therapy at [address] [place], [address]  Phone: [phone]   Fax:[phone]         Physical Therapy Treatment Note  Date:  2021    Patient Name:  Junaid Swain      :  2000    MRN: [id number]    Diagnosis: L ACL revision BPTB + Med Men Repair + ALL autograft 21                  Surgeon: Kimberley Dyer  Next Doctor Visit:      Insurance/Certification information:       Plan of care signed (Y/N): Pending  Changes to ambulatory summary sheet? No    Summary of history from Evaluation:  Sport:  Basketball Vinny point guard  Patient Goal:  Pt wants to rehab as though she will play next season, though she isn't sure if she will play. Patient History/Mechanism of Injury: Pt tore R ACL vinny year in [facility] 2016. Had ACL ham graft , was released at 6 months. Came to [place] 2018 with no issues. Tore L ACL in 2019, had hamstring graft, did well in rehab, released to basketball in 2020. On  pt was at basketball practice and had contact with another player and was hit on the medial side of L knee. Pt felt two pops and pain. Surgery 21.     Subjective/Pain: Sat  and  at wedding in heels, on her feet a lot. Monday drove home 3 hours and had swelling and knee soreness 3-4/10 and stiff, unable to get full flexion. Missing full extension today    Objective: joint swelling noted today after weekend wearing heels and walking.   3-4/10 pain a couple days ago, 1-2/10 today at medial and lateral joint line  Exercises:  21 L ACL+med men+all 21    #29 #30        Steemer   =21wks =24wks   Gym dynamics Ham kicks  Mirant  Power jumps  Puddle jumps  Braiding  Box drill x3  [id number] Ham kicks  Mirant  Power jumps  Puddle jumps  Braiding  Box drill x3  [id number]   Burpies x20 x10   Bench press jump  2x5   Gym running  100yds x3 300 x2  60  69   DL hops 20yd hopx2 agilty zig zag   SL triple crossover  tripple crossover x5 x5   SL quarter rot hops x5lat x5med NT   Step walkover UE on bosu  60sec 36   Lateral shuffle  NT NT   Pivot 45 x3 x3   Lunge + shoot  NT NT   Lateral walking +TB +dribble  Sliding board 60sec x34   RDL  Blue bosu chair reach +MB ball x15 bilateral On bosu reach to seat of chair x20 andrea   Walking lunges on turf 60yds bkwd rotational lunge x15 andrea NT   Lateral Lunge x15 NT   Core: halo + ball plank Halo x20 andrea  Plank [id number] Halo x20  Plank [id number]   Running program HEP Ran a mile in sub 9, will run 800<330 walk a lap, repeat 800 in 3:15 walk a lap  twice   SL chair sit +3in x30 NT   Lateral Step down NT NT   Roller Hamstrings  SL SL painful   FITNESS CENTER      Calf Raises SLx30 SL x30   4way hip abduction 70#x15 NT   4way hip extension + ham 70#x15 NT   SL Quad Extension NT    Prone Hamstring Curl  20# 2x10 20#2x10   SL Press L=50#  R=70# 2x10 NT due to pain   TABLE     Hamstring stretches  [id number]   Home Exercises Given: see above ex 4x per week     Assessment: improving each visit as we advance thoughtful plyo + dynamics    Treatment/Activity Tolerance:  [x] Patient tolerated treatment well [] Patient limited by fatigue  [] Patient limited by pain  [] Patient limited by other medical complications  [] Other:     Plan: CONT PER ORDERS.  To see MD second week in Aug for probable DC to bridge     Time In/Time Out: 9008-5079    Timed Code/Total Treatment Minutes:   2TE 1N    Electronically signed by:

## 2021-08-10 ENCOUNTER — HOSPITAL ENCOUNTER (OUTPATIENT)
Dept: PHYSICAL THERAPY | Age: 21
Setting detail: THERAPIES SERIES
Discharge: HOME | End: 2021-08-10
Payer: COMMERCIAL

## 2021-08-10 PROCEDURE — 97112 NEUROMUSCULAR REEDUCATION: CPT

## 2021-08-10 PROCEDURE — 97110 THERAPEUTIC EXERCISES: CPT

## 2021-08-10 NOTE — FLOWSHEET NOTE
[facility]) Physical Therapy at [address] [place], [address]   Phone: [phone]   Fax:[phone]         Physical Therapy Treatment Note  Date:  8/10/2021    Patient Name:  Jacki Savage      :  2000    MRN: [id number]    Diagnosis: L ACL revision BPTB + Med Men Repair + ALL autograft 21                  Surgeon: Aramis Nichole  Next Doctor Visit:      Insurance/Certification information:       Plan of care signed (Y/N): Pending  Changes to ambulatory summary sheet? No    Summary of history from Evaluation:  Sport:  Basketball Vinny point guard  Patient Goal:  Pt wants to rehab as though she will play next season, though she isn't sure if she will play. Patient History/Mechanism of Injury: Pt tore R ACL vinny year in [facility] 2016. Had ACL ham graft , was released at 6 months. Came to [place] 2018 with no issues. Tore L ACL in 2019, had hamstring graft, did well in rehab, released to basketball in 2020. On  pt was at basketball practice and had contact with another player and was hit on the medial side of L knee. Pt felt two pops and pain. Surgery 21.     Subjective/Pain: having some post knee pain 3/10, slowly decreasing. Feels as though exercise makes her pain less    Objective: joint swelling noted today after weekend wearing heels and walking.   3-4/10 pain a couple days ago, 1-2/10 today at medial and lateral joint line  Exercises:  21 L ACL+med men+all 07-19-21 08-04-21 08-10-21    #29 #30 #31         Steemer   =21wks =24wks    Gym dynamics  Ham kicks  Mirant  Power jumps  Puddle jumps  Braiding  Box drill x3   Ham kicks  Butt kicks  Power jumps  Puddle jumps  Braiding  Box drill x3  [id number] Ham kicks  Butt kicks  Power jumps  Puddle jumps  Braiding  Box drill x3   .5mile elliptical  2x50 JR  dynamics   Burpies x20 x10 x10   Bench press jump  2x5    Gym running  100yds x3 300 x2  60  69 60%

## 2021-08-12 ENCOUNTER — HOSPITAL ENCOUNTER (OUTPATIENT)
Dept: PHYSICAL THERAPY | Age: 21
Setting detail: THERAPIES SERIES
Discharge: HOME | End: 2021-08-12
Payer: COMMERCIAL

## 2021-08-12 PROCEDURE — 97110 THERAPEUTIC EXERCISES: CPT

## 2021-08-12 PROCEDURE — 97112 NEUROMUSCULAR REEDUCATION: CPT

## 2021-08-17 ENCOUNTER — HOSPITAL ENCOUNTER (OUTPATIENT)
Dept: PHYSICAL THERAPY | Age: 21
Setting detail: THERAPIES SERIES
Discharge: HOME | End: 2021-08-17
Payer: COMMERCIAL

## 2021-08-17 PROCEDURE — 97112 NEUROMUSCULAR REEDUCATION: CPT

## 2021-08-17 PROCEDURE — 97110 THERAPEUTIC EXERCISES: CPT

## 2021-08-17 NOTE — FLOWSHEET NOTE
[facility] ([facility]) Physical Therapy at [address], [address]  Phone: [phone]   Fax:[phone]         Physical Therapy Treatment Note  Date:  2021    Patient Name:  Aldo Dominguez      :  2000    MRN: [id number]    Diagnosis: L ACL revision BPTB + Med Men Repair + ALL autograft 21                  Surgeon: Laverna Lesch  Next Doctor Visit:    IKDC 39.2%  Insurance/Certification information:       Plan of care signed (Y/N): Pending  Changes to ambulatory summary sheet? 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 Patient History/Mechanism of Injury: Pt tore R ACL hipolito year in [facility] . Had ACL ham graft , was released at 6 months. Came to [place] 2018 with no issues. Tore L ACL in 2019, had hamstring graft, did well in rehab, released to basketball in 2020. On  pt was at basketball practice and had contact with another player and was hit on the medial side of L knee. Pt felt two pops and pain. Surgery 21.     Subjective/Pain: having some post knee pain 1/10, slowly decreasing. Feels some lateral patellar pain 2/10 after workouts.  Ran 800's x2 3:35 and 3:40 no pain    Objective: no noted edema   Exercises:  21 L ACL+med men+all 08-04-21 08-10-21 08-12-21 08-17-21    #30 #31            Steemer   8/6=24wks  =25wks =26wks   Gym dynamics  Ham kicks  Mirant  Power jumps  Puddle jumps  Braiding  Box drill x3   Ham kicks  Mirant  Power jumps  Puddle jumps  Braiding  Box drill x3   .5mile elliptical  2x50 JR  dynamics 1 mile on bike  Dynamics  JR 2x50  SLx20   1 mile on bike  Dynamics    Burpies x10 x10 bkwd rot lunge jump x15 with catch  NT  Walking lunges 59sec court x2   Bench press jump 2x5  x10 bilateral to target x10 to target   Gym running  300 layups, full court ball handling through cones, jump shots full court, ladder drills and agility on basketball court   9/6 full court ball handling+defense (zig zag no shoot)  Full court defense no finishing (shooting) begin modified team lifting/conditioning  9/13 full court ball handling + defense with finish (shooting),  Full court defense + finish (shooting)  9/20 1v1 half court  9/27 2v2 half court  3v3?  10/4 3v3 half court limited 5v5 full court  10/11 limited 5v5, full release 10/15     Treatment/Activity Tolerance:  [x] Patient tolerated treatment well [] Patient limited by fatigue  [] Patient limited by pain  [] Patient limited by other medical complications  [] Other:     Plan: WILL test for [address] next visit     Time In/Time Out: 3780-1788    Timed Code/Total Treatment Minutes:   2TE 1N    Electronically signed by:

## 2021-08-20 ENCOUNTER — HOSPITAL ENCOUNTER (OUTPATIENT)
Dept: PHYSICAL THERAPY | Age: 21
Setting detail: THERAPIES SERIES
Discharge: HOME | End: 2021-08-20
Payer: COMMERCIAL

## 2021-08-20 PROCEDURE — 97530 THERAPEUTIC ACTIVITIES: CPT

## 2021-08-20 NOTE — FLOWSHEET NOTE
[facility]) Physical Therapy at [address] [place], [address]  Phone: [phone]   Fax:[phone]         Physical Therapy Treatment Note  Date:  2021    Patient Name:  Rachel Rincon      :  2000    MRN: [id number]    Diagnosis: L ACL revision BPTB + Med Men Repair + ALL autograft 21                  Surgeon: Ev Dumont  Next Doctor Visit:    IKDC 67.8%  Insurance/Certification information:       Plan of care signed (Y/N): Pending  Changes to ambulatory summary sheet? No    Summary of history from Evaluation:  Sport:  Basketball Vinny point guard  Patient Goal:  Pt wants to rehab as though she will play next season, though she isn't sure if she will play. Patient History/Mechanism of Injury: Pt tore R ACL vinny year in [facility] . Had ACL ham graft , was released at 6 months. Came to [place] 2018 with no issues. Tore L ACL in 2019, had hamstring graft, did well in rehab, released to basketball in 2020. On  pt was at basketball practice and had contact with another player and was hit on the medial side of L knee. Pt felt two pops and pain. Surgery 21.     Subjective/Pain: having some post knee pain 1/10, slowly decreasing. Feels some lateral patellar pain 2/10 after workouts.  Ran 800's x2 3:35 and 3:40 no pain    Objective: no noted edema   Exercises:  21 L ACL+med men+all 08-04-21 08-10-21 08-12-21 08-17-21    #30 #31            Steemer   8/6=24wks  /=25wks 8=26wks   Gym dynamics  Ham kicks  Mirant  Power jumps  Puddle jumps  Braiding  Box drill x3   Ham kicks  Mirant  Power jumps  Puddle jumps  Braiding  Box drill x3   .5mile elliptical  2x50 JR  dynamics 1 mile on bike  Dynamics  JR 2x50  SLx20   1 mile on bike  Dynamics    Burpies x10 x10 bkwd rot lunge jump x15 with catch  NT  Walking lunges 59sec court x2   Bench press jump 2x5  x10 bilateral to target x10 to target   Gym running  300 x2  60  69 60% +burpiex2  70% +burpiex2  80% +burpiex2 300   56 sec Court x8   DL hops agilty zig zag Zig zag + 2 tucks x4 SL hop L54 R=65 SL x5   SL triple crossover  x5 NT SL crossover x5 x5   SL quarter rot hops NT NT SL double hops x5 NT   Step walkover UE on bosu 60sec 36 Halo hops Split squats x15 NT   SL shuttle vertical   1C 2x10 1C x10 1C x10   Lateral shuffle  NT   NT   Pivot 45 x3  NT x3   Lunge + shoot  NT NT NT NT   Lateral walking +TB +dribble Sliding board 60sec x34 NT NT NT   RDL  On bosu reach to seat of chair x20 andrea NT NT Touch bar x15   Walking lunges on turf 60yds NT 60yds 77sec Split lunges x15 bilateral Court length x2 59sec   Lateral Lunge NT NT Lateral lung bkwd rotation NT   Core: halo + ball plank Halo x20  Plank [id number] Halo leg hops 60sec  Plank 2x60 HEP Halo x15  Ball planks 4n90xpp   Running program  in 3:15 walk a lap  twice 800 3:24 then 3:09.  Will do 400's 1:20 with 30sec recover x4 HEP HEP   SL chair sit NT NT L=18 R=25 L=26 R=30   Lateral Step down NT NT L=30  R=30 30 from 8in   Roller Hamstrings  SL painful SL 2x10  SL 2x10   FITNESS CENTER        Calf Raises SL x30 SL x30 x30 bilateral x30   4way hip abduction NT 70# 2x10 70#x15 70# x15   4way hip extension + ham NT 70# 2x10 70#x15 70# x15   SL Quad Extension  L=50# R=70# 2x10 NT NT   Prone Hamstring Curl  20#2x10 30# 210 30# 2x10 30# 2x10   SL Press NT due to pain L=50# 2x10 L=50# 2x10 L=90#  x10 R=90#x10   TABLE       Hamstring stretches  [id number] [id number] 8q03lum + roller   Home Exercises Given: see above ex 4x per week     Assessment: improving each visit as we advance thoughtful plyo + dynamics  Pt has created timeline for return to basketball, will discuss with MD at next visit  26wks=testing RUNNING PROGRAM 400's 1:20 with 30 sec recover x4  8/23 begin bridge including speed treadmill work, standing ball handling, shooting machine, half court shooting with ball handle/shoot, half court layups  8/30 full court layups, full court ball handling through cones, jump shots full court, ladder drills and agility on basketball court   9/6 full court ball handling+defense (zig zag no shoot)  Full court defense no finishing (shooting) begin modified team lifting/conditioning  9/13 full court ball handling + defense with finish (shooting),  Full court defense + finish (shooting)  9/20 1v1 half court  9/27 2v2 half court  3v3?  10/4 3v3 half court limited 5v5 full court  10/11 limited 5v5, full release 10/15     Treatment/Activity Tolerance:  [x] Patient tolerated treatment well [] Patient limited by fatigue  [] Patient limited by pain  [] Patient limited by other medical complications  [] Other:     Plan: DC    Time In/Time Out: 6482-6222    Timed Code/Total Treatment Minutes:   3TA    Electronically signed by:

## 2021-12-13 ENCOUNTER — HOSPITAL ENCOUNTER (OUTPATIENT)
Age: 21
Setting detail: SPECIMEN
Discharge: HOME | End: 2021-12-13
Payer: COMMERCIAL

## 2021-12-13 ENCOUNTER — OFFICE VISIT (OUTPATIENT)
Dept: FAMILY MEDICINE CLINIC | Age: 21
End: 2021-12-13
Payer: COMMERCIAL

## 2021-12-13 VITALS — WEIGHT: 130 LBS | TEMPERATURE: 97.6 F | BODY MASS INDEX: 21.63 KG/M2 | HEART RATE: 86 BPM | OXYGEN SATURATION: 98 %

## 2021-12-13 DIAGNOSIS — R68.89 FLU-LIKE SYMPTOMS: Primary | ICD-10-CM

## 2021-12-13 LAB
INFLUENZA VIRUS A RNA: POSITIVE
INFLUENZA VIRUS B RNA: NEGATIVE
SARS-COV-2: NOT DETECTED
SOURCE: NORMAL

## 2021-12-13 PROCEDURE — U0003 INFECTIOUS AGENT DETECTION BY NUCLEIC ACID (DNA OR RNA); SEVERE ACUTE RESPIRATORY SYNDROME CORONAVIRUS 2 (SARS-COV-2) (CORONAVIRUS DISEASE [COVID-19]), AMPLIFIED PROBE TECHNIQUE, MAKING USE OF HIGH THROUGHPUT TECHNOLOGIES AS DESCRIBED BY CMS-2020-01-R: HCPCS

## 2021-12-13 PROCEDURE — 87502 INFLUENZA DNA AMP PROBE: CPT | Performed by: NURSE PRACTITIONER

## 2021-12-13 PROCEDURE — 99213 OFFICE O/P EST LOW 20 MIN: CPT | Performed by: NURSE PRACTITIONER

## 2021-12-13 PROCEDURE — U0005 INFEC AGEN DETEC AMPLI PROBE: HCPCS

## 2021-12-13 RX ORDER — OSELTAMIVIR PHOSPHATE 75 MG/1
75 CAPSULE ORAL 2 TIMES DAILY
Qty: 10 CAPSULE | Refills: 0 | Status: SHIPPED | OUTPATIENT
Start: 2021-12-13 | End: 2021-12-18

## 2021-12-13 NOTE — PROGRESS NOTES
12/13/21  Minus Cleaves  2000    FLU/COVID-19 CLINIC EVALUATION    HPI SYMPTOMS:    Employer: [employer] Student    [] Fevers  [x] Chills  [x] Cough  [] Coughing up blood  [x] Chest Congestion  [x] Nasal Congestion  [] Feeling short of breath  [] Sometimes  [] Frequently  [] All the time  [x] Chest pain  [x] Headaches  [x]Tolerable  [] Severe  [x] Sore throat  [x] Muscle aches  [] Nausea  [] Vomiting  []Unable to keep fluids down  [] Diarrhea  []Severe    [x] OTHER SYMPTOMS:  fatigue  Symptom Duration:   [] 1  [x] 2   [] 3   [] 4    [] 5   [] 6   [] 7   [] 8   [] 9   [] 10   [] 11   [] 12   [] 13   [] 14   [] Longer than 14 days    Symptom course:   [] Worsening     [x] Stable     [] Improving    RISK FACTORS:    [] Pregnant or possibly pregnant  [] Age over 61  [] Diabetes  [] Heart disease  [] Asthma  [] COPD/Other chronic lung diseases  [] Active Cancer  [] On Chemotherapy  [] Taking oral steroids  [] History Lymphoma/Leukemia  [] Close contact with a lab confirmed COVID-19 patient within 14 days of symptom onset  [] History of travel from affected geographical areas within 14 days of symptom onset       VITALS:  There were no vitals filed for this visit. TESTS:    POCT FLU:  [] Positive     []Negative    ASSESSMENT:    [] Flu  [] Possible COVID-19  [] Strep    PLAN:    [] Discharge home with written instructions for:  [] Flu management  [] Possible COVID-19 infection with self-quarantine and management of symptoms  [] Follow-up with primary care physician or emergency department if worsens  [] Evaluation per physician/NP/PA in clinic  [] Sent to ER       An  electronic signature was used to authenticate this note.      --Padmini Hall on 12/13/2021 at 9:48 AM

## 2021-12-13 NOTE — PATIENT INSTRUCTIONS
Your COVID 19 test can take 1-5 days for the results to come back. We ask that you make a Mychart page and view your test results this way. You will need to Self quarantine until you know your results. Increase fluids and rest  Saline nasal spray as needed for nasal congestion  Warm salt gargles as needed for throat discomfort  Monitor temperature twice a day  Tylenol as needed for fevers and/or discomfort. Big deep breaths periodically throughout the day  Regular Mucinex over the counter as needed for chest congestion  If symptoms worsen -Go to the ER. Follow up with your primary care provider      To Whom it May Concern:    Carr Body was tested for COVID-19 12/13/2021. He/she must stay home until test results are back. If test is positive, he/she must quarantine for a total of 10 days starting from day one of symptom onset. He/she must also be fever-free for 24 hours at that time, and also have improvement in symptoms. We do not recommend retesting as patients may continue to test positive for months even though no longer contagious. It is suggested you call [address] or  [facility] with any questions regarding quarantine timeframe/return to work/school details.

## 2021-12-13 NOTE — PROGRESS NOTES
12/13/2021    HPI:  Chief complaint and history of present illness as per medical assistant/nurse documented today in the Flu/COVID-19 clinic. Patient is here with complaints of fever/chills, cough, chest/nasal congestion, chest tightness with cough, headache, sore throat, muscle aches, and fatigue x 2 days. Patient states she has had her covid vaccine. MEDICATIONS:  Prior to Visit Medications    Not on File       No Known Allergies, History reviewed. No pertinent past medical history. ,   Past Surgical History:   Procedure Laterality Date    KNEE SURGERY     ,   Social History     Tobacco Use    Smoking status: Never Smoker    Smokeless tobacco: Never Used   Vaping Use    Vaping Use: Never used   Substance Use Topics    Alcohol use: No    Drug use: No   ,   Family History   Problem Relation Age of Onset    Cancer Father    ,   Immunization History   Administered Date(s) Administered    COVID-19, Pfizer, PF, 30mcg/0.3mL 03/30/2021, 04/22/2021    DTaP (Infanrix) 2000, 2000, 01/17/2001, 12/14/2001, 12/03/2014    Hepatitis B Ped/Adol (Engerix-B, Recombivax HB) 2000, 2000    Hib PRP-OMP (PedvaxHIB) 2000, 2000, 12/14/2001, 01/17/2002    MMR 12/14/2001, 10/30/2006    MMRV (ProQuad) 12/14/2001, 09/30/2013    Meningococcal MCV4P (Menactra) 12/03/2014, 07/25/2016    Pneumococcal Conjugate 7-valent (Lucendia Latus) 2000, 11/17/2001    Polio IPV (IPOL) 2000, 2000, 12/14/2001, 10/30/2006   ,   Health Maintenance   Topic Date Due    Hepatitis C screen  Never done    Hepatitis B vaccine (3 of 3 - 3-dose primary series) 2000    HPV vaccine (1 - 2-dose series) Never done    HIV screen  Never done    Chlamydia screen  Never done    Pap smear  Never done    Flu vaccine (1) Never done    COVID-19 Vaccine (3 - Booster for Anaya Peter series) 10/22/2021    DTaP/Tdap/Td vaccine (6 - Tdap) 12/03/2024    Hib vaccine  Completed    Varicella vaccine  Completed    Meningococcal (ACWY) vaccine  Completed    Hepatitis A vaccine  Aged Out    Pneumococcal 0-64 years Vaccine  Aged Out       PHYSICAL EXAM:  Physical Exam  Constitutional:       Appearance: Normal appearance. HENT:      Head: Normocephalic. Right Ear: Tympanic membrane, ear canal and external ear normal.      Left Ear: Tympanic membrane, ear canal and external ear normal.      Nose: Congestion present. Mouth/Throat:      Lips: Pink. Mouth: Mucous membranes are moist.      Pharynx: Posterior oropharyngeal erythema (slight) present. Cardiovascular:      Rate and Rhythm: Normal rate and regular rhythm. Heart sounds: Normal heart sounds. Pulmonary:      Effort: Pulmonary effort is normal.      Breath sounds: Normal breath sounds. Musculoskeletal:      Cervical back: Neck supple. Skin:     General: Skin is warm and dry. Neurological:      Mental Status: She is alert and oriented to person, place, and time. Psychiatric:         Mood and Affect: Mood normal.         Behavior: Behavior normal.         ASSESSMENT/PLAN:  1. Flu-like symptoms  Positive for influenza A. Your COVID 19 test can take 1-5 days for the results to come back. We ask that you make a Mychart page and view your test results this way. You will need to Self quarantine until you know your results. Increase fluids and rest  Saline nasal spray as needed for nasal congestion  Warm salt gargles as needed for throat discomfort  Monitor temperature twice a day  Tylenol as needed for fevers and/or discomfort. Big deep breaths periodically throughout the day  Regular Mucinex over the counter as needed for chest congestion  If symptoms worsen -Go to the ER. Follow up with your primary care provider  - Covid-19 Ambulatory  - POCT Influenza A/B DNA (Alere i)  - oseltamivir (TAMIFLU) 75 MG capsule; Take 1 capsule by mouth 2 times daily for 5 days  Dispense: 10 capsule;  Refill: 0      FOLLOW-UP:  Return if symptoms worsen or fail to improve.     In addition to other information, the printed after visit summary provided to the patient includes:  [x] COVID-19 Self care instructions  [x] COVID-19 General patient information

## 2023-03-16 ENCOUNTER — HOSPITAL ENCOUNTER (OUTPATIENT)
Dept: PHYSICAL THERAPY | Age: 23
Discharge: HOME | End: 2023-03-16

## 2023-03-16 NOTE — PROGRESS NOTES
[facility]) at [facility]   [address] [address]  Fax [phone]     Dolores Dave PT,ATC Phone: [phone]                                              To:  Dr Som Fernández          From: Felicia Dawson, PT, PT ATC      Patient: Houston Goldmann                    : 2000    Diagnosis: L ACL revision BPTB + Med Men Repair + ALL autograft 21    Date: 3/16/2023         PT DISCHARGE                  Evaluation Date: 03-10-21         Total Visits to date: 34      Outcome Measure:  IKDC                    Initial Score:  56.3%  Discharge Score:   80.5%          Sport:  Basketball Vinny point guard  Patient Goal:  Pt wants to rehab as though she will play next season, though she isn't sure if she will play. Patient History/Mechanism of Injury: Pt tore R ACL vinny year in [facility] 2016. Had ACL ham graft , was released at 6 months. Came to [place] 2018 with no issues. Tore L ACL in 2019, had hamstring graft, did well in rehab, released to basketball in 2020. On  pt was at basketball practice and had contact with another player and was hit on the medial side of L knee. Pt felt two pops and pain. Surgery 21. Symptoms: pt returned to basketball at 9 months postop in 2021. Played the season with brace without difficulty. Finished the season and graduated. Is not in grad school. Did not play in  basketball season. Has decided to return to team for  season. Has been doing HIIT workouts, has been playing basketball in open gyms 1-2 times per week.       Objective/Significant Findings + Goals     21 Goals TBA prior to DC to [facility] op Week  -12wks postop -wks postop 9 MONTHS POST OP ON  2 years postop    Outcome IKDC 56.3%.  80.5%   >90% indicating psychological readiness to compete   Brace Not yet fitted for functional knee brace Fitted for functional brace Wearing functional brace Not wearing brace Fittend for functional brace   Pain Sore 2/10  No pain No pain No pain   Sleeping Sleeping all night  Sleeping all night Sleeping all night MET MET   Gait Normal walking gait, jogging gait not assessed Walking and jogging gait normal Normal gait normal Normal walking/jogging gait on flat and uneven surfaces at endurance and sprint speeds   Quad Tone Good+ Good+ Good + Excellent Excellent with noted VMO fully engaged   PROM Flexion L=135 mild tightness R=144 L=134   R=144 145 bilateral 145 andrea MET   PROM Ext L=   R=0 L=0   R=0 0 bilateral 0 andrea MET   Edema (knee circumference at jt line) L=34cm  R=33.5cm L=33cm  R=33cm MET MET MET   Quad Girth (5in sup to patella) L=43.5cm  R=45.5cm L=46.5cm  R=48cm L=46.5cm R=48cm L=46.5cm  R=47cm equal   Calf Girth 10in prox to lat mal L=31.5cm R=33.5cm L=32cm R=33.5cm same  equal       300 in 55.30    Glut Medius microfet strength  L=24.3lb  R=23.5lb (31%deficit) L=28.6lb  R=28.7lb (18%deficit) 35 bilateral  35lb bilateral   Lateral Step Down timed 30sec 8in L=28   R=28  ( 7%deficit) 8in L=30  R=30  (0 %deficit) MET 30 bilateral MET   Single Leg Squat to 21in surface 30sec L=29  R=30 (3%deficit) 30sec L=30  R=30 ( 0%deficit) MET L=38 R=38 MET   Proprioception SL Bosu Tossup L=30  R=30 ( 0%deficit) L=30  R=30 (0%deficit) MET 30 andrea  MET   SL reach L=16   R=20   (27%deficit) L=17.5   R= 20  (12%deficit) L=19.5  R=20 L=22in  R=22in 22in   SL calf raises  L=30  R=30  (0%deficit) L=30  R=30  (0%deficit) MET 30 andrea  MET   SL Horizontal Hop L=51in  R=67in (14%deficit) L=62.5n  R=68in (8%deficit) L=60  R=62 L=72in  R=73in 67in Bilateral   SL double  Unable (100% deficit) L=153  R=170  (10%deficit) L=165 R=179 L=143in L=219me equal   SL 20yd hop for time Unable (100% deficit) L=5.91   R=5.01  (15%deficit) L=6.16  R=6.01 L=5.13 R=4.82 equal   SL Seated Press L=70#   R=70#x10 (0%deficit) L=90#   R=90#x10 (0%deficit) MET L=70#  R=70# 81#   SL Seated Quad Extension 90-40 L=60#x10  R=70#x10 (14%deficit) L=80# R=80#x10 (0%deficit) MET 80#x10 80#   SL Prone Hamstring Curl L=   R=30#x10 (25%deficit) L=40#  R=40# x10 (33%deficit) 50#x10 L=50# x5  R=50#x7  50-60#   Quad / Hamstring ratio     >60%female  >70%male    28% 9% DEFICIT <5% deficit <5%    Quality of Movement Patterns            Tuck Jump 10sec  NT 4/20 (20% deficit)   No valgus, thighs equal and parallel in flight, feet land soft and simultaneously at sh width with symmetrical placement. No pause between jumps and no excessive trunk motion in flight. Goal Status: [] Achieved [x] Partially Achieved      Assessment:  Rehab Potential:   [x] Excellent   PATIENT IS HIGHLY MOTIVATED    Plan: Pt has completed her bridge program in the weight room. Will now lift with team, modified if needed. Pt is transitioning back to full basketball as guided by AT. PLEASE REVIEW AT PLAN BELOW, SIGN IF AGREE TO PLAN: (This plan was developed by Mariya Laguna, KENZIE)    Practice: pt has progressed back to full practices  SCRIMMAGE 11/1: no minutes  SCRIMMAGE 11/5: 15 min of intermittent play   GAME 11/12: 15 min of intermittent play  GAME 11/17: 20 min of intermittent play  GAME 11/19: 25 min of intermittent play   GAME 11/21 UNRESTRICTED MINUTES OF GAME PLAY (PT WILL BE 9 MONTHS POSTOP ON 11/26)          Electronically signed by:  Courtney Morocho, PT, PT, 3/16/2023, 1:36 PM    If you have any questions or concerns, please don't hesitate to call.  Thank you for your referral.    Physician Signature:__________________________________ Date:______ Time: ________  By signing above, therapists plan is approved by physician

## 2025-06-07 NOTE — FLOWSHEET NOTE
[facility]) Physical Therapy at [address] [place], [address]  Phone: [phone]   Fax:[phone]         Physical Therapy Treatment Note  Date:  2021    Patient Name:  Hieu Weeks      :  2000    MRN: [id number]    Diagnosis: L ACL revision BPTB + Med Men Repair + ALL autograft 21                  Surgeon: Sarita Duffy  Next Doctor Visit:       Insurance/Certification information:       Plan of care signed (Y/N): Pending  Changes to ambulatory summary sheet? No    Summary of history from Evaluation:  Sport:  Basketball Vinny point guard  Patient Goal:  Pt wants to rehab as though she will play next season, though she isn't sure if she will play. Patient History/Mechanism of Injury: Pt tore R ACL vinny year in [facility] 2016. Had ACL ham graft , was released at 6 months. Came to [place] 2018 with no issues. Tore L ACL in 2019, had hamstring graft, did well in rehab, released to basketball in 2020. On  pt was at basketball practice and had contact with another player and was hit on the medial side of L knee. Pt felt two pops and pain. Surgery 21.     Subjective/Pain:  0-2/10.  See Sarita Duffy in 2 days    Objective: TIGHT FLEXION 115 today    Exercises:  21 L ACL 04    Visit#8 Visit#9    4/=5wks =6wks   ROM         Ext 0 0   Flex 105 after 15 min stretching 115 after 10 min stretching         [employer]     Bike 5min 5min   LAQ 2#90-40 x30 2#90-40 x30   Exercises     SLR 2#x30 2#x30   sidelying hip abd 2#x30 2#x30, go up to 3# next rx   TKE x30 x30   Calf Raises DL x30 DL x30, SL next visit   Bosu standing  DL x30 basketball tosses DL bball add mini squats next rx   SL stance  SL airex tossups SL black bosu [id number] tosses   Fwd step up  Next rx   4way hip  Next rx   Hamstring ball holds Wk 6 60sec prone   [employer] [address] [id number]   Quadriserv with L leg ext 7#x30 7#x30   isometrics x10 x10   Biceps in
220